# Patient Record
Sex: FEMALE | Race: WHITE | NOT HISPANIC OR LATINO | Employment: UNEMPLOYED | URBAN - METROPOLITAN AREA
[De-identification: names, ages, dates, MRNs, and addresses within clinical notes are randomized per-mention and may not be internally consistent; named-entity substitution may affect disease eponyms.]

---

## 2020-10-02 ENCOUNTER — HOSPITAL ENCOUNTER (OUTPATIENT)
Dept: URGENT CARE | Facility: CLINIC | Age: 25
Discharge: HOME | End: 2020-10-02
Attending: EMERGENCY MEDICINE

## 2020-11-03 ENCOUNTER — OFFICE VISIT (OUTPATIENT)
Dept: FAMILY MEDICINE CLINIC | Facility: CLINIC | Age: 25
End: 2020-11-03

## 2020-11-03 VITALS
HEART RATE: 84 BPM | DIASTOLIC BLOOD PRESSURE: 74 MMHG | BODY MASS INDEX: 37.89 KG/M2 | WEIGHT: 250 LBS | OXYGEN SATURATION: 99 % | HEIGHT: 68 IN | SYSTOLIC BLOOD PRESSURE: 98 MMHG | TEMPERATURE: 97.5 F

## 2020-11-03 DIAGNOSIS — Z3A.09 9 WEEKS GESTATION OF PREGNANCY: Primary | ICD-10-CM

## 2020-11-03 PROCEDURE — 99203 OFFICE O/P NEW LOW 30 MIN: CPT | Performed by: NURSE PRACTITIONER

## 2020-11-03 RX ORDER — PHENOL 1.4 %
600 AEROSOL, SPRAY (ML) MUCOUS MEMBRANE DAILY
COMMUNITY
End: 2021-04-28

## 2020-11-03 RX ORDER — FERROUS SULFATE 325(65) MG
325 TABLET ORAL
Status: ON HOLD | COMMUNITY
End: 2021-06-15 | Stop reason: SDUPTHER

## 2020-11-03 RX ORDER — PRENATAL VIT NO.126/IRON/FOLIC 28MG-0.8MG
1 TABLET ORAL DAILY
COMMUNITY
End: 2021-06-25

## 2020-11-03 NOTE — PROGRESS NOTES
Subjective   Randi Martell is a 25 y.o. female.     Chief Complaint   Patient presents with   • Possible Pregnancy     New Patient - referral to OBGYN     Ms. Martell presents today to establish care with a PCP. She reports she is 9.5 weeks pregnant and is needing a referral to OB/GYN. She report that other than morning sickness she is doing well and has no concerns.        I have reviewed the patient's medical history in detail and updated the computerized patient record.    The following portions of the patient's history were reviewed and updated as appropriate: allergies, current medications, past family history, past medical history, past social history, past surgical history and problem list.      Current Outpatient Medications:   •  calcium carbonate (OS-BLANQUITA) 600 MG tablet, Take 600 mg by mouth Daily., Disp: , Rfl:   •  ferrous sulfate 325 (65 FE) MG tablet, Take 325 mg by mouth Daily With Breakfast., Disp: , Rfl:   •  prenatal vitamin (prenatal, CLASSIC, vitamin) tablet, Take 1 tablet by mouth Daily., Disp: , Rfl:      Review of Systems   Constitutional: Negative.    Respiratory: Negative.    Cardiovascular: Negative.    Gastrointestinal: Positive for nausea and vomiting.   Genitourinary:        Pregnant about 9 weeks   Neurological: Negative.    Psychiatric/Behavioral: Negative.        Objective    Vitals:    11/03/20 1042   BP: 98/74   Pulse: 84   Temp: 97.5 °F (36.4 °C)   SpO2: 99%     Physical Exam  Constitutional:       Appearance: Normal appearance.   Neck:      Musculoskeletal: Normal range of motion and neck supple.   Cardiovascular:      Rate and Rhythm: Normal rate and regular rhythm.      Pulses: Normal pulses.      Heart sounds: Normal heart sounds.   Pulmonary:      Effort: Pulmonary effort is normal.      Breath sounds: Normal breath sounds.   Musculoskeletal: Normal range of motion.      Right lower leg: No edema.      Left lower leg: No edema.   Neurological:      Mental Status: She is alert  and oriented to person, place, and time.   Psychiatric:         Mood and Affect: Mood normal.         Behavior: Behavior normal.         Thought Content: Thought content normal.         Judgment: Judgment normal.           Assessment/Plan   Diagnoses and all orders for this visit:    1. 9 weeks gestation of pregnancy (Primary)  -     Ambulatory Referral to Obstetrics / Gynecology      Ms. Best's physical exam is unremarkable today.  I have reviewed her medical records that are available in the Epic EMR system today, which there are none.   I am referring her to OB/GYN for evaluation and management of her pregnancy.   I have discussed with her that she may see me as she needs me and next summer after she delivers for an annual physical exam.

## 2020-11-06 ENCOUNTER — TELEPHONE (OUTPATIENT)
Dept: FAMILY MEDICINE CLINIC | Facility: CLINIC | Age: 25
End: 2020-11-06

## 2020-11-06 NOTE — TELEPHONE ENCOUNTER
[facility]    Received Disability forms by fax and need to discuss with patient.    HUB - ok to transfer

## 2020-11-09 ENCOUNTER — INITIAL PRENATAL (OUTPATIENT)
Dept: OBSTETRICS AND GYNECOLOGY | Facility: CLINIC | Age: 25
End: 2020-11-09

## 2020-11-09 VITALS — WEIGHT: 249.6 LBS | BODY MASS INDEX: 37.95 KG/M2 | SYSTOLIC BLOOD PRESSURE: 116 MMHG | DIASTOLIC BLOOD PRESSURE: 83 MMHG

## 2020-11-09 DIAGNOSIS — Z34.90 EARLY STAGE OF PREGNANCY: Primary | ICD-10-CM

## 2020-11-09 LAB
GLUCOSE UR STRIP-MCNC: NEGATIVE MG/DL
PROT UR STRIP-MCNC: ABNORMAL MG/DL

## 2020-11-09 PROCEDURE — 99203 OFFICE O/P NEW LOW 30 MIN: CPT | Performed by: OBSTETRICS & GYNECOLOGY

## 2020-11-09 PROCEDURE — 81025 URINE PREGNANCY TEST: CPT | Performed by: OBSTETRICS & GYNECOLOGY

## 2020-11-09 RX ORDER — PROMETHAZINE HYDROCHLORIDE 25 MG/1
25 TABLET ORAL EVERY 6 HOURS PRN
Qty: 30 TABLET | Refills: 0 | Status: SHIPPED | OUTPATIENT
Start: 2020-11-09 | End: 2021-01-15

## 2020-11-09 NOTE — PROGRESS NOTES
Chief Complaint   Patient presents with   • Initial Prenatal Visit     HPI- Pt is 25 y.o.  at 10w2d here for prenatal visit.  Patient presents for initial OB visit.  This pregnancy was unplanned and patient states she is sure regarding her LMP.  Patient does report significant nausea and vomiting and is requesting medication.  She is taking prenatal vitamins and iron.  She has no major medical problems.    ROS-     - No vaginal bleeding    GI- No abdominal pain    /83   Wt 113 kg (249 lb 9.6 oz)   LMP 2020 (Exact Date)   BMI 37.95 kg/m²   Exam - See flow sheet    Fetal heart rate is normal    Assessment-  Diagnoses and all orders for this visit:    Early stage of pregnancy  -     OB Panel With HIV  -     Urine Culture - Urine, Urine, Clean Catch  -     IGP,CtNgTv,rfx Aptima HPV ASCU  -     US Ob Transvaginal; Future    Other orders  -     POC Urinalysis Dipstick  -     promethazine (PHENERGAN) 25 MG tablet; Take 1 tablet by mouth Every 6 (Six) Hours As Needed for Nausea or Vomiting.      Initial OB counseling was done.  Discussed delivering hospital and call system.  OB labs were ordered along with dating ultrasound.  Prescription was sent for Phenergan for her to use as needed for nausea.  She will follow-up in 4 weeks for next OB visit.    Counseling was given to patient for the following topics: risk factor reductions, patient and family education and impressions . Total time of the encounter was 30 minutes and 20 minutes was spend counseling.

## 2020-11-10 LAB
ABO GROUP BLD: ABNORMAL
BASOPHILS # BLD AUTO: 0 X10E3/UL (ref 0–0.2)
BASOPHILS NFR BLD AUTO: 0 %
BLD GP AB SCN SERPL QL: NEGATIVE
EOSINOPHIL # BLD AUTO: 0 X10E3/UL (ref 0–0.4)
EOSINOPHIL NFR BLD AUTO: 0 %
ERYTHROCYTE [DISTWIDTH] IN BLOOD BY AUTOMATED COUNT: 19.3 % (ref 11.7–15.4)
HBV SURFACE AG SERPL QL IA: NEGATIVE
HCT VFR BLD AUTO: 34.5 % (ref 34–46.6)
HCV AB S/CO SERPL IA: <0.1 S/CO RATIO (ref 0–0.9)
HGB BLD-MCNC: 11.3 G/DL (ref 11.1–15.9)
HIV 1+2 AB+HIV1 P24 AG SERPL QL IA: NON REACTIVE
IMM GRANULOCYTES # BLD AUTO: 0 X10E3/UL (ref 0–0.1)
IMM GRANULOCYTES NFR BLD AUTO: 0 %
LYMPHOCYTES # BLD AUTO: 1.8 X10E3/UL (ref 0.7–3.1)
LYMPHOCYTES NFR BLD AUTO: 24 %
MCH RBC QN AUTO: 25.2 PG (ref 26.6–33)
MCHC RBC AUTO-ENTMCNC: 32.8 G/DL (ref 31.5–35.7)
MCV RBC AUTO: 77 FL (ref 79–97)
MONOCYTES # BLD AUTO: 0.5 X10E3/UL (ref 0.1–0.9)
MONOCYTES NFR BLD AUTO: 7 %
NEUTROPHILS # BLD AUTO: 5.1 X10E3/UL (ref 1.4–7)
NEUTROPHILS NFR BLD AUTO: 69 %
PLATELET # BLD AUTO: 357 X10E3/UL (ref 150–450)
RBC # BLD AUTO: 4.48 X10E6/UL (ref 3.77–5.28)
RH BLD: POSITIVE
RPR SER QL: NON REACTIVE
RUBV IGG SERPL IA-ACNC: 3.77 INDEX
WBC # BLD AUTO: 7.4 X10E3/UL (ref 3.4–10.8)

## 2020-11-11 LAB
BACTERIA UR CULT: NO GROWTH
BACTERIA UR CULT: NORMAL
C TRACH RRNA CVX QL NAA+PROBE: NEGATIVE
CONV .: NORMAL
CYTOLOGIST CVX/VAG CYTO: NORMAL
CYTOLOGY CVX/VAG DOC CYTO: NORMAL
CYTOLOGY CVX/VAG DOC THIN PREP: NORMAL
DX ICD CODE: NORMAL
HIV 1 & 2 AB SER-IMP: NORMAL
N GONORRHOEA RRNA CVX QL NAA+PROBE: NEGATIVE
OTHER STN SPEC: NORMAL
STAT OF ADQ CVX/VAG CYTO-IMP: NORMAL
T VAGINALIS RRNA SPEC QL NAA+PROBE: NEGATIVE

## 2020-11-12 LAB
B-HCG UR QL: POSITIVE
INTERNAL NEGATIVE CONTROL: NEGATIVE
INTERNAL POSITIVE CONTROL: POSITIVE
Lab: ABNORMAL

## 2020-11-18 ENCOUNTER — TELEPHONE (OUTPATIENT)
Dept: FAMILY MEDICINE CLINIC | Facility: CLINIC | Age: 25
End: 2020-11-18

## 2020-11-18 NOTE — TELEPHONE ENCOUNTER
PATIENT'S MEDICAL LEAVE PROVIDER IS CALLING TO GET HELP WITH PAPERWORK. THEY NEED TO GET A REASON FOR THIS PATIENT BEING OFF WORK.   SHE WOULD LIKE TO GET INFO FOR DATE OF: 10/7/2020    FAXED INFO TO OFFICE ON 11/9/2020    THERE INFO IS ON THE FORMS   FX#: [phone]

## 2020-12-09 ENCOUNTER — ROUTINE PRENATAL (OUTPATIENT)
Dept: OBSTETRICS AND GYNECOLOGY | Facility: CLINIC | Age: 25
End: 2020-12-09

## 2020-12-09 VITALS — DIASTOLIC BLOOD PRESSURE: 81 MMHG | SYSTOLIC BLOOD PRESSURE: 114 MMHG | BODY MASS INDEX: 38.5 KG/M2 | WEIGHT: 253.2 LBS

## 2020-12-09 DIAGNOSIS — O21.9 NAUSEA AND VOMITING DURING PREGNANCY PRIOR TO 22 WEEKS GESTATION: ICD-10-CM

## 2020-12-09 DIAGNOSIS — Z34.02 ENCOUNTER FOR SUPERVISION OF NORMAL FIRST PREGNANCY IN SECOND TRIMESTER: Primary | ICD-10-CM

## 2020-12-09 LAB
GLUCOSE UR STRIP-MCNC: NEGATIVE MG/DL
PROT UR STRIP-MCNC: ABNORMAL MG/DL

## 2020-12-09 PROCEDURE — 99213 OFFICE O/P EST LOW 20 MIN: CPT | Performed by: OBSTETRICS & GYNECOLOGY

## 2020-12-09 RX ORDER — ONDANSETRON 4 MG/1
4 TABLET, FILM COATED ORAL EVERY 6 HOURS PRN
Qty: 30 TABLET | Refills: 1 | Status: SHIPPED | OUTPATIENT
Start: 2020-12-09 | End: 2021-06-15 | Stop reason: HOSPADM

## 2020-12-09 NOTE — PROGRESS NOTES
Chief Complaint   Patient presents with   • Routine Prenatal Visit     HPI- Pt is 25 y.o.  at 14w4d here for prenatal visit.  Patient states that she is continuing to have nausea and vomiting.  She does report that she has some good days.  She has been using Phenergan as needed.    ROS-     - No vaginal bleeding    GI- No abdominal pain    /81   Wt 115 kg (253 lb 3.2 oz)   LMP 2020 (Exact Date)   BMI 38.50 kg/m²   Exam - See flow sheet    Fetal heart rate is normal    Assessment-  Diagnoses and all orders for this visit:    Encounter for supervision of normal first pregnancy in second trimester  -     [id number] PLUS Core - Blood,    Nausea and vomiting during pregnancy prior to 22 weeks gestation  -     ondansetron (Zofran) 4 MG tablet; Take 1 tablet by mouth Every 6 (Six) Hours As Needed for Nausea or Vomiting.    Other orders  -     POC Urinalysis Dipstick    Initial OB labs and dating ultrasound were reviewed with the patient.  She has had weight gain since her last visit.  We will try Zofran as needed for nausea.  Discussed with patient eating small, frequent meals throughout the day to help with her symptoms.  Discussed genetic screening and she elects for cell free DNA testing.  She will follow up in 4 weeks.

## 2020-12-14 ENCOUNTER — TELEPHONE (OUTPATIENT)
Dept: OBSTETRICS AND GYNECOLOGY | Facility: CLINIC | Age: 25
End: 2020-12-14

## 2020-12-17 LAB
CFDNA.FET/CFDNA.TOTAL SFR FETUS: NORMAL %
CITATION REF LAB TEST: NORMAL
FET 13+18+21+X+Y ANEUP PLAS.CFDNA: NEGATIVE
FET CHR 21 TS PLAS.CFDNA QL: NEGATIVE
FET SEX PLAS.CFDNA DOSAGE CFDNA: NORMAL
FET TS 13 RISK PLAS.CFDNA QL: NEGATIVE
FET TS 18 RISK WBC.DNA+CFDNA QL: NEGATIVE
GA EST FROM CONCEPTION DATE: NORMAL D
GESTATIONAL AGE > 9:: YES
LAB DIRECTOR NAME PROVIDER: NORMAL
LAB DIRECTOR NAME PROVIDER: NORMAL
LABORATORY COMMENT REPORT: NORMAL
LIMITATIONS OF THE TEST: NORMAL
NEGATIVE PREDICTIVE VALUE: NORMAL
NOTE: NORMAL
PERFORMANCE CHARACTERISTICS: NORMAL
POSITIVE PREDICTIVE VALUE: NORMAL
REF LAB TEST METHOD: NORMAL
TEST PERFORMANCE INFO SPEC: NORMAL

## 2020-12-18 ENCOUNTER — TELEPHONE (OUTPATIENT)
Dept: OBSTETRICS AND GYNECOLOGY | Facility: CLINIC | Age: 25
End: 2020-12-18

## 2020-12-18 NOTE — TELEPHONE ENCOUNTER
----- Message from Clarita Hermosillo MD sent at 12/17/2020  4:20 PM EST -----  Please let patient know that her genetic screen is negative and if she wants to know gender, it shows a male fetus

## 2021-01-15 ENCOUNTER — ROUTINE PRENATAL (OUTPATIENT)
Dept: OBSTETRICS AND GYNECOLOGY | Facility: CLINIC | Age: 26
End: 2021-01-15

## 2021-01-15 VITALS — SYSTOLIC BLOOD PRESSURE: 129 MMHG | BODY MASS INDEX: 39.68 KG/M2 | WEIGHT: 261 LBS | DIASTOLIC BLOOD PRESSURE: 83 MMHG

## 2021-01-15 DIAGNOSIS — Z36.89 ENCOUNTER FOR FETAL ANATOMIC SURVEY: ICD-10-CM

## 2021-01-15 DIAGNOSIS — Z34.02 ENCOUNTER FOR SUPERVISION OF NORMAL FIRST PREGNANCY IN SECOND TRIMESTER: Primary | ICD-10-CM

## 2021-01-15 LAB
GLUCOSE UR STRIP-MCNC: NEGATIVE MG/DL
PROT UR STRIP-MCNC: NEGATIVE MG/DL

## 2021-01-15 PROCEDURE — 99213 OFFICE O/P EST LOW 20 MIN: CPT | Performed by: OBSTETRICS & GYNECOLOGY

## 2021-01-15 NOTE — PROGRESS NOTES
Chief Complaint   Patient presents with   • Routine Prenatal Visit     HPI- Pt is 25 y.o.  at 19w6d here for prenatal visit.  Patient has no complaints.  She reports her nausea has improved.  She is starting to feel some fetal movement.    ROS-     - No vaginal bleeding    GI- No abdominal pain    /83   Wt 118 kg (261 lb)   LMP 2020 (Exact Date)   BMI 39.68 kg/m²   Exam - See flow sheet    Fetal heart rate is normal    Assessment-  Diagnoses and all orders for this visit:    Encounter for supervision of normal first pregnancy in second trimester  -     Alpha Fetoprotein, Maternal    Encounter for fetal anatomic survey  -     US Ob 14 + Weeks Single or First Gestation; Future    Other orders  -     POC Urinalysis Dipstick      Patient is doing well.  Patient had normal cell free DNA testing.  She was counseled on screening for spina bifida and she desires.  She will follow-up next week for anatomy ultrasound and will see me back in 4 weeks.    I spent 20 minutes caring for Randi on this date of service. This time includes time spent by me in the following activities:reviewing tests, performing a medically appropriate examination and/or evaluation , counseling and educating the patient/family/caregiver, ordering medications, tests, or procedures and documenting information in the medical record

## 2021-01-17 LAB
AFP ADJ MOM SERPL: 0.87
AFP INTERP SERPL-IMP: NORMAL
AFP INTERP SERPL-IMP: NORMAL
AFP SERPL-MCNC: 34 NG/ML
AGE AT DELIVERY: 27 YR
GA METHOD: NORMAL
GA: 19.6 WEEKS
IDDM PATIENT QL: NO
LABORATORY COMMENT REPORT: NORMAL
MULTIPLE PREGNANCY: NO
NEURAL TUBE DEFECT RISK FETUS: NORMAL %
RESULT: NORMAL

## 2021-01-19 ENCOUNTER — TELEPHONE (OUTPATIENT)
Dept: OBSTETRICS AND GYNECOLOGY | Facility: CLINIC | Age: 26
End: 2021-01-19

## 2021-01-19 NOTE — TELEPHONE ENCOUNTER
----- Message from Clarita Hermosillo MD sent at 1/18/2021 12:47 PM EST -----  Please let patient know her screening for neural tube defects was negative

## 2021-02-19 ENCOUNTER — ROUTINE PRENATAL (OUTPATIENT)
Dept: OBSTETRICS AND GYNECOLOGY | Facility: CLINIC | Age: 26
End: 2021-02-19

## 2021-02-19 VITALS — DIASTOLIC BLOOD PRESSURE: 84 MMHG | BODY MASS INDEX: 42.57 KG/M2 | SYSTOLIC BLOOD PRESSURE: 127 MMHG | WEIGHT: 280 LBS

## 2021-02-19 DIAGNOSIS — Z34.02 ENCOUNTER FOR SUPERVISION OF NORMAL FIRST PREGNANCY IN SECOND TRIMESTER: Primary | ICD-10-CM

## 2021-02-19 LAB
GLUCOSE UR STRIP-MCNC: NEGATIVE MG/DL
PROT UR STRIP-MCNC: NEGATIVE MG/DL

## 2021-02-19 PROCEDURE — 99213 OFFICE O/P EST LOW 20 MIN: CPT | Performed by: OBSTETRICS & GYNECOLOGY

## 2021-02-19 NOTE — PROGRESS NOTES
Chief Complaint   Patient presents with   • Routine Prenatal Visit     HPI- Pt is 25 y.o.  at 24w6d here for prenatal visit.  Patient is doing well with no complaints.  She is feeling lots of fetal movement.    ROS-     - No vaginal bleeding    GI- No abdominal pain    /84   Wt 127 kg (280 lb)   LMP 2020 (Exact Date)   BMI 42.57 kg/m²   Exam - See flow sheet    Fetal heart rate is normal    Assessment-  Diagnoses and all orders for this visit:    Encounter for supervision of normal first pregnancy in second trimester  -     Gestational Screen 1 Hr ([facility])  -     CBC (No Diff)    Other orders  -     POC Urinalysis Dipstick    Reviewed anatomy ultrasound with patient and explained that it is normal.  Discussed screening for diabetes at her next visit and instructions discussed.  Discussed prenatal classes and hospital tours.  She will follow-up in 4 weeks.    I spent 20 minutes caring for Randi on this date of service. This time includes time spent by me in the following activities:reviewing tests, obtaining and/or reviewing a separately obtained history, performing a medically appropriate examination and/or evaluation , counseling and educating the patient/family/caregiver, ordering medications, tests, or procedures and documenting information in the medical record

## 2021-03-19 ENCOUNTER — ROUTINE PRENATAL (OUTPATIENT)
Dept: OBSTETRICS AND GYNECOLOGY | Facility: CLINIC | Age: 26
End: 2021-03-19

## 2021-03-19 VITALS — DIASTOLIC BLOOD PRESSURE: 79 MMHG | SYSTOLIC BLOOD PRESSURE: 115 MMHG | WEIGHT: 292 LBS | BODY MASS INDEX: 44.4 KG/M2

## 2021-03-19 DIAGNOSIS — Z34.03 ENCOUNTER FOR SUPERVISION OF NORMAL FIRST PREGNANCY IN THIRD TRIMESTER: Primary | ICD-10-CM

## 2021-03-19 LAB
GLUCOSE UR STRIP-MCNC: NEGATIVE MG/DL
PROT UR STRIP-MCNC: NEGATIVE MG/DL

## 2021-03-19 PROCEDURE — 99213 OFFICE O/P EST LOW 20 MIN: CPT | Performed by: OBSTETRICS & GYNECOLOGY

## 2021-03-19 NOTE — PROGRESS NOTES
Chief Complaint   Patient presents with   • Routine Prenatal Visit     HPI- Pt is 25 y.o.  at 28w6d here for prenatal visit.  Patient has no complaints.  She reports good fetal movement.    ROS-     - No vaginal bleeding    GI- No abdominal pain    /79   Wt 132 kg (292 lb)   LMP 2020 (Exact Date)   BMI 44.40 kg/m²   Exam - See flow sheet    Fetal heart rate is normal    Assessment-  Diagnoses and all orders for this visit:    Encounter for supervision of normal first pregnancy in third trimester    Other orders  -     Discontinue: Calcium 280 MG tablet; Take  by mouth.  -     POC Urinalysis Dipstick    Patient continues to do well.  She is doing her 1 hour glucose test today.  Discussed with patient finding a pediatrician before delivery.  She will follow-up with me in 2 weeks.    I spent 20 minutes caring for Randi on this date of service. This time includes time spent by me in the following activities:performing a medically appropriate examination and/or evaluation , counseling and educating the patient/family/caregiver and documenting information in the medical record

## 2021-03-20 LAB
ERYTHROCYTE [DISTWIDTH] IN BLOOD BY AUTOMATED COUNT: 13.8 % (ref 12.3–15.4)
GLUCOSE 1H P 50 G GLC PO SERPL-MCNC: 103 MG/DL (ref 65–139)
HCT VFR BLD AUTO: 32.7 % (ref 34–46.6)
HGB BLD-MCNC: 10.7 G/DL (ref 12–15.9)
MCH RBC QN AUTO: 27.6 PG (ref 26.6–33)
MCHC RBC AUTO-ENTMCNC: 32.7 G/DL (ref 31.5–35.7)
MCV RBC AUTO: 84.5 FL (ref 79–97)
PLATELET # BLD AUTO: 290 10*3/MM3 (ref 140–450)
RBC # BLD AUTO: 3.87 10*6/MM3 (ref 3.77–5.28)
WBC # BLD AUTO: 9.8 10*3/MM3 (ref 3.4–10.8)

## 2021-03-22 ENCOUNTER — TELEPHONE (OUTPATIENT)
Dept: OBSTETRICS AND GYNECOLOGY | Facility: CLINIC | Age: 26
End: 2021-03-22

## 2021-03-22 NOTE — TELEPHONE ENCOUNTER
----- Message from Clarita Hermosillo MD sent at 3/22/2021  8:59 AM EDT -----  Please let patient know that she passed her glucose test.  Her blood work does show that she is slightly anemic and she should make sure that she is taking ferrous sulfate once daily.  If she needs me to send a prescription, please let me know.

## 2021-04-02 ENCOUNTER — ROUTINE PRENATAL (OUTPATIENT)
Dept: OBSTETRICS AND GYNECOLOGY | Facility: CLINIC | Age: 26
End: 2021-04-02

## 2021-04-02 VITALS — SYSTOLIC BLOOD PRESSURE: 118 MMHG | WEIGHT: 293 LBS | DIASTOLIC BLOOD PRESSURE: 70 MMHG | BODY MASS INDEX: 45.61 KG/M2

## 2021-04-02 DIAGNOSIS — Z34.83 PRENATAL CARE, SUBSEQUENT PREGNANCY IN THIRD TRIMESTER: Primary | ICD-10-CM

## 2021-04-02 DIAGNOSIS — O99.213 OBESITY AFFECTING PREGNANCY IN THIRD TRIMESTER: ICD-10-CM

## 2021-04-02 DIAGNOSIS — Z3A.30 30 WEEKS GESTATION OF PREGNANCY: ICD-10-CM

## 2021-04-02 LAB
GLUCOSE UR STRIP-MCNC: NEGATIVE MG/DL
PROT UR STRIP-MCNC: NEGATIVE MG/DL

## 2021-04-02 PROCEDURE — 99213 OFFICE O/P EST LOW 20 MIN: CPT | Performed by: OBSTETRICS & GYNECOLOGY

## 2021-04-02 NOTE — PROGRESS NOTES
OB follow up     Randi Martell is a 25 y.o.  30w6d being seen today for her obstetrical visit.  Patient reports no complaints. Fetal movement: normal.    Her prenatal care is complicated by (and status): Maternal obesity affecting pregnancy    Review of Systems  Cramping/contractions : Occasional Louann Martinez with activity  Vaginal bleeding: None  Fetal movement normal and active    /70   Wt 136 kg (300 lb)   LMP 2020 (Exact Date)   BMI 45.61 kg/m²     FHT:  142 BPM   Uterine Size: size greater than dates       Assessment    Diagnoses and all orders for this visit:    1. Prenatal care, subsequent pregnancy in third trimester (Primary)    2. 30 weeks gestation of pregnancy    3. Obesity affecting pregnancy in third trimester  -     US Ob Follow Up Transabdominal Approach; Future        1) pregnancy at 30w6d  2) maternal obesity affecting pregnancy.  Will get growth ultrasound for her next visit with Dr. Salazar.        Plan    Reviewed this stage of pregnancy  Problem list updated   Follow up in 2 weeks with growth ultrasound and appointment with Dr. Hermosillo.  Tdap discussed with the patient and she will decide between now and her next visit.    Peter Cesar MD   2021  11:25 EDT

## 2021-04-16 ENCOUNTER — ROUTINE PRENATAL (OUTPATIENT)
Dept: OBSTETRICS AND GYNECOLOGY | Facility: CLINIC | Age: 26
End: 2021-04-16

## 2021-04-16 VITALS — BODY MASS INDEX: 45.22 KG/M2 | SYSTOLIC BLOOD PRESSURE: 117 MMHG | DIASTOLIC BLOOD PRESSURE: 84 MMHG | WEIGHT: 293 LBS

## 2021-04-16 DIAGNOSIS — O99.213 OBESITY AFFECTING PREGNANCY IN THIRD TRIMESTER: Primary | ICD-10-CM

## 2021-04-16 DIAGNOSIS — Z3A.32 32 WEEKS GESTATION OF PREGNANCY: ICD-10-CM

## 2021-04-16 LAB
GLUCOSE UR STRIP-MCNC: NEGATIVE MG/DL
PROT UR STRIP-MCNC: NEGATIVE MG/DL

## 2021-04-16 PROCEDURE — 90715 TDAP VACCINE 7 YRS/> IM: CPT | Performed by: OBSTETRICS & GYNECOLOGY

## 2021-04-16 PROCEDURE — 90471 IMMUNIZATION ADMIN: CPT | Performed by: OBSTETRICS & GYNECOLOGY

## 2021-04-16 PROCEDURE — 99213 OFFICE O/P EST LOW 20 MIN: CPT | Performed by: OBSTETRICS & GYNECOLOGY

## 2021-04-16 NOTE — PROGRESS NOTES
Chief Complaint   Patient presents with   • Routine Prenatal Visit     HPI- Pt is 25 y.o.  at 32w6d here for prenatal visit.  Patient is doing well with no major complaints.  She reports adequate fetal movement.    ROS-     - No vaginal bleeding    GI- No abdominal pain    /84   Wt 135 kg (297 lb 6.4 oz)   LMP 2020 (Exact Date)   BMI 45.22 kg/m²   Exam - See flow sheet    Fetal heart rate is normal    Assessment-  Diagnoses and all orders for this visit:    Obesity affecting pregnancy in third trimester    32 weeks gestation of pregnancy    Other orders  -     POC Urinalysis Dipstick      Growth ultrasound was performed today and shows appropriate fetal growth.  Ultrasound was reviewed with her.  We will repeat ultrasound in 4 weeks.  Discussed recommendations for Tdap and she desires.  She will follow-up in 2 weeks.    I spent 20 minutes caring for Randi on this date of service. This time includes time spent by me in the following activities:obtaining and/or reviewing a separately obtained history, performing a medically appropriate examination and/or evaluation , counseling and educating the patient/family/caregiver, ordering medications, tests, or procedures, documenting information in the medical record and independently interpreting results and communicating that information with the patient/family/caregiver

## 2021-04-28 ENCOUNTER — HOSPITAL ENCOUNTER (OUTPATIENT)
Dept: LABOR AND DELIVERY | Facility: HOSPITAL | Age: 26
Discharge: HOME | End: 2021-04-28
Attending: OBSTETRICS & GYNECOLOGY

## 2021-04-28 ENCOUNTER — ROUTINE PRENATAL (OUTPATIENT)
Dept: OBSTETRICS AND GYNECOLOGY | Facility: CLINIC | Age: 26
End: 2021-04-28

## 2021-04-28 VITALS — SYSTOLIC BLOOD PRESSURE: 110 MMHG | WEIGHT: 293 LBS | BODY MASS INDEX: 46.28 KG/M2 | DIASTOLIC BLOOD PRESSURE: 79 MMHG

## 2021-04-28 DIAGNOSIS — Z3A.34 34 WEEKS GESTATION OF PREGNANCY: ICD-10-CM

## 2021-04-28 DIAGNOSIS — O99.213 OBESITY AFFECTING PREGNANCY IN THIRD TRIMESTER: Primary | ICD-10-CM

## 2021-04-28 LAB
GLUCOSE UR STRIP-MCNC: NEGATIVE MG/DL
PROT UR STRIP-MCNC: NEGATIVE MG/DL

## 2021-04-28 PROCEDURE — 99213 OFFICE O/P EST LOW 20 MIN: CPT | Performed by: OBSTETRICS & GYNECOLOGY

## 2021-04-28 NOTE — PROGRESS NOTES
Chief Complaint   Patient presents with   • Routine Prenatal Visit     HPI- Pt is 25 y.o.  at 34w4d here for prenatal visit.  Patient is doing well.  She does report some back pain.  She reports adequate fetal movement.         ROS-     - No vaginal bleeding    GI- No abdominal pain    /79   Wt (!) 138 kg (304 lb 6.4 oz)   LMP 2020 (Exact Date)   BMI 46.28 kg/m²   Exam - See flow sheet    Fetal heart rate is normal    Assessment-  Diagnoses and all orders for this visit:    Obesity affecting pregnancy in third trimester    34 weeks gestation of pregnancy    Other orders  -     POC Urinalysis Dipstick    She continues to do well.  Discussed GBS culture and cervical exam at her next visit.    I spent 20 minutes caring for Randi on this date of service. This time includes time spent by me in the following activities:obtaining and/or reviewing a separately obtained history, performing a medically appropriate examination and/or evaluation , counseling and educating the patient/family/caregiver and documenting information in the medical record

## 2021-04-28 NOTE — PATIENT INSTRUCTIONS
Group B Streptococcus Test During Pregnancy  Why am I having this test?  Routine testing, also called screening, for group B streptococcus (GBS) is recommended for all pregnant women between the 36th and 37th week of pregnancy. GBS is a type of bacteria that can be passed from mother to baby during childbirth. Screening will help guide whether or not you will need treatment during labor and delivery to prevent complications such as:  · An infection in your uterus during labor.  · An infection in your uterus after delivery.  · A serious infection in your baby after delivery, such as pneumonia, meningitis, or sepsis.  GBS screening is not often done before 36 weeks of pregnancy unless you go into labor prematurely.  What happens if I have group B streptococcus?  If testing shows that you have GBS, your health care provider will recommend treatment with IV antibiotics during labor and delivery. This treatment significantly decreases the risk of complications for you and your baby.  If you have a planned  and you have GBS, you may not need to be treated with antibiotics because GBS is usually passed to babies after labor starts and your water breaks. If you are in labor or your water breaks before your , it is possible for GBS to get into your uterus and be passed to your baby, so you might need treatment.  Is there a chance I may not need to be tested?  You may not need to be tested for GBS if:  · You have a urine test that shows GBS before 36 to 37 weeks.  · You had a baby with GBS infection after a previous delivery.  In these cases, you will automatically be treated for GBS during labor and delivery.  What is being tested?  This test is done to check if you have group B streptococcus in your vagina or rectum.  What kind of sample is taken?  To collect samples for this test, your health care provider will swab your vagina and rectum with a cotton swab. The sample is then sent to the lab to see if  GBS is present.  What happens during the test?    · You will remove your clothing from the waist down.  · You will lie down on an exam table in the same position as you would for a pelvic exam.  · Your health care provider will swab your vagina and rectum to collect samples for a culture test.  · You will be able to go home after the test and do all your usual activities.  How are the results reported?  The test results are reported as positive or negative.  What do the results mean?  · A positive test means you are at risk for passing GBS to your baby during labor and delivery. Your health care provider will recommend that you are treated with an IV antibiotic during labor and delivery.  · A negative test means you are at very low risk of passing GBS to your baby. There is still a low risk of passing GBS to your baby because sometimes test results may report that you do not have a condition when you do (false-negative result) or there is a chance that you may become infected with GBS after the test is done. You most likely will not need to be treated with an antibiotic during labor and delivery.  Talk with your health care provider about what your results mean.  Questions to ask your health care provider  Ask your health care provider, or the department that is doing the test:  · When will my results be ready?  · How will I get my results?  · What are my treatment options?  Summary  · Routine testing (screening) for group B streptococcus (GBS) is recommended for all pregnant women between the 36th and 37th week of pregnancy.  · GBS is a type of bacteria that can be passed from mother to baby during childbirth.  · If testing shows that you have GBS, your health care provider will recommend that you are treated with IV antibiotics during labor and delivery. This treatment almost always prevents infection in newborns.  This information is not intended to replace advice given to you by your health care provider. Make  sure you discuss any questions you have with your health care provider.  Document Revised: 04/09/2020 Document Reviewed: 01/15/2020  Elsevier Patient Education © 2021 [employer]

## 2021-05-14 ENCOUNTER — ROUTINE PRENATAL (OUTPATIENT)
Dept: OBSTETRICS AND GYNECOLOGY | Facility: CLINIC | Age: 26
End: 2021-05-14

## 2021-05-14 VITALS — BODY MASS INDEX: 46.83 KG/M2 | WEIGHT: 293 LBS | SYSTOLIC BLOOD PRESSURE: 117 MMHG | DIASTOLIC BLOOD PRESSURE: 82 MMHG

## 2021-05-14 DIAGNOSIS — Z3A.36 36 WEEKS GESTATION OF PREGNANCY: ICD-10-CM

## 2021-05-14 DIAGNOSIS — O99.213 OBESITY AFFECTING PREGNANCY IN THIRD TRIMESTER: Primary | ICD-10-CM

## 2021-05-14 LAB
GLUCOSE UR STRIP-MCNC: NEGATIVE MG/DL
PROT UR STRIP-MCNC: NEGATIVE MG/DL

## 2021-05-14 PROCEDURE — 99213 OFFICE O/P EST LOW 20 MIN: CPT | Performed by: OBSTETRICS & GYNECOLOGY

## 2021-05-14 NOTE — PROGRESS NOTES
Chief Complaint   Patient presents with   • Routine Prenatal Visit     HPI- Pt is 26 y.o.  at 36w6d here for prenatal visit.  She has no major complaints today.  She is feeling more discomfort and some Pangburn Martinez contractions.  She reports adequate fetal movement.    ROS-     - No vaginal bleeding    GI- No abdominal pain    /82   Wt (!) 140 kg (308 lb)   LMP 2020 (Exact Date)   BMI 46.83 kg/m²   Exam - See flow sheet    Fetal heart rate is normal    Assessment-  Diagnoses and all orders for this visit:    Obesity affecting pregnancy in third trimester  -     Group B Streptococcus Culture - Swab, Vaginal/Rectum    36 weeks gestation of pregnancy    Other orders  -     POC Urinalysis Dipstick    Labor precautions were discussed and when to go to the hospital.  GBS culture was obtained.  She will follow-up in 1 week.

## 2021-05-18 ENCOUNTER — ROUTINE PRENATAL (OUTPATIENT)
Dept: OBSTETRICS AND GYNECOLOGY | Facility: CLINIC | Age: 26
End: 2021-05-18

## 2021-05-18 VITALS — SYSTOLIC BLOOD PRESSURE: 121 MMHG | DIASTOLIC BLOOD PRESSURE: 88 MMHG | BODY MASS INDEX: 47.23 KG/M2 | WEIGHT: 293 LBS

## 2021-05-18 DIAGNOSIS — Z3A.37 37 WEEKS GESTATION OF PREGNANCY: ICD-10-CM

## 2021-05-18 DIAGNOSIS — O99.213 OBESITY AFFECTING PREGNANCY IN THIRD TRIMESTER: Primary | ICD-10-CM

## 2021-05-18 LAB
B-HEM STREP SPEC QL CULT: NEGATIVE
GLUCOSE UR STRIP-MCNC: NEGATIVE MG/DL
PROT UR STRIP-MCNC: NEGATIVE MG/DL

## 2021-05-18 PROCEDURE — 99213 OFFICE O/P EST LOW 20 MIN: CPT | Performed by: OBSTETRICS & GYNECOLOGY

## 2021-05-18 NOTE — PROGRESS NOTES
Chief Complaint   Patient presents with   • Routine Prenatal Visit     HPI- Pt is 26 y.o.  at 37w3d here for prenatal visit.  Patient has no major complaints.  She has felt some Brenton Martinez contractions.  She reports adequate fetal movement.       ROS-     - No vaginal bleeding    GI- No abdominal pain    /88   Wt (!) 141 kg (310 lb 9.6 oz)   LMP 2020 (Exact Date)   BMI 47.23 kg/m²   Exam - See flow sheet    Fetal heart rate is normal    Assessment-  Diagnoses and all orders for this visit:    Obesity affecting pregnancy in third trimester    37 weeks gestation of pregnancy    Other orders  -     POC Urinalysis Dipstick    Reviewed negative GBS culture.  Discussed when to go to labor and delivery and answered questions regarding epidural.  She continues to do well we will continue with expectant management.  She will follow-up with me in 1 week.    I spent 20 minutes caring for Randi on this date of service. This time includes time spent by me in the following activities:reviewing tests, obtaining and/or reviewing a separately obtained history, performing a medically appropriate examination and/or evaluation , counseling and educating the patient/family/caregiver and documenting information in the medical record

## 2021-05-28 ENCOUNTER — ROUTINE PRENATAL (OUTPATIENT)
Dept: OBSTETRICS AND GYNECOLOGY | Facility: CLINIC | Age: 26
End: 2021-05-28

## 2021-05-28 VITALS — WEIGHT: 293 LBS | BODY MASS INDEX: 47.01 KG/M2 | DIASTOLIC BLOOD PRESSURE: 88 MMHG | SYSTOLIC BLOOD PRESSURE: 120 MMHG

## 2021-05-28 DIAGNOSIS — Z3A.38 38 WEEKS GESTATION OF PREGNANCY: ICD-10-CM

## 2021-05-28 DIAGNOSIS — O99.213 OBESITY AFFECTING PREGNANCY IN THIRD TRIMESTER: Primary | ICD-10-CM

## 2021-05-28 LAB
GLUCOSE UR STRIP-MCNC: NEGATIVE MG/DL
PROT UR STRIP-MCNC: NEGATIVE MG/DL

## 2021-05-28 PROCEDURE — 99214 OFFICE O/P EST MOD 30 MIN: CPT | Performed by: OBSTETRICS & GYNECOLOGY

## 2021-05-28 NOTE — PROGRESS NOTES
Chief Complaint   Patient presents with   • Routine Prenatal Visit     HPI- Pt is 26 y.o.  at 38w6d here for prenatal visit. Patient continues to do well with no complaints. She reports adequate fetal movement. She denies leaking or bleeding.    ROS-     - No vaginal bleeding    GI- No abdominal pain    /88   Wt (!) 140 kg (309 lb 3.2 oz)   LMP 2020 (Exact Date)   BMI 47.01 kg/m²   Exam - See flow sheet    Fetal heart rate is normal    Assessment-  Diagnoses and all orders for this visit:    Obesity affecting pregnancy in third trimester  -     US Fetal Biophysical Profile;Without Non-Stress Testing; Future    38 weeks gestation of pregnancy    Other orders  -     POC Urinalysis Dipstick    Patient continues to do well. She will see one of my partners next week for OB follow-up and then will see me on  with BPP due to postdates and we will plan induction on  if she is still undelivered.

## 2021-06-08 ENCOUNTER — ROUTINE PRENATAL (OUTPATIENT)
Dept: OBSTETRICS AND GYNECOLOGY | Facility: CLINIC | Age: 26
End: 2021-06-08

## 2021-06-08 VITALS — SYSTOLIC BLOOD PRESSURE: 122 MMHG | WEIGHT: 293 LBS | BODY MASS INDEX: 47.83 KG/M2 | DIASTOLIC BLOOD PRESSURE: 89 MMHG

## 2021-06-08 DIAGNOSIS — O48.0 POST-TERM PREGNANCY, 40-42 WEEKS OF GESTATION: ICD-10-CM

## 2021-06-08 DIAGNOSIS — O36.63X0 EXCESSIVE FETAL GROWTH AFFECTING MANAGEMENT OF PREGNANCY IN THIRD TRIMESTER, SINGLE OR UNSPECIFIED FETUS: ICD-10-CM

## 2021-06-08 DIAGNOSIS — O99.213 OBESITY AFFECTING PREGNANCY IN THIRD TRIMESTER: Primary | ICD-10-CM

## 2021-06-08 DIAGNOSIS — Z3A.40 40 WEEKS GESTATION OF PREGNANCY: ICD-10-CM

## 2021-06-08 LAB
GLUCOSE UR STRIP-MCNC: NEGATIVE MG/DL
PROT UR STRIP-MCNC: NEGATIVE MG/DL

## 2021-06-08 PROCEDURE — 99214 OFFICE O/P EST MOD 30 MIN: CPT | Performed by: OBSTETRICS & GYNECOLOGY

## 2021-06-08 NOTE — PROGRESS NOTES
Chief Complaint   Patient presents with   • Routine Prenatal Visit     HPI- Pt is 26 y.o.  at 40w3d here for prenatal visit.  She reports some contractions, but denies any leaking or bleeding.  She reports adequate fetal movement.  She denies any headaches, vision changes, or right upper quadrant pain.    ROS-     - No vaginal bleeding    GI- No abdominal pain    /89   Wt (!) 143 kg (314 lb 9.6 oz)   LMP 2020 (Exact Date)   BMI 47.83 kg/m²   Exam - See flow sheet    Fetal heart rate is normal    Assessment-  Diagnoses and all orders for this visit:    Obesity affecting pregnancy in third trimester    Post-term pregnancy, 40-42 weeks of gestation  -     Labor Induction; Future    40 weeks gestation of pregnancy    Excessive fetal growth affecting management of pregnancy in third trimester, single or unspecified fetus    Other orders  -     POC Urinalysis Dipstick    Patient's ultrasound today shows LGA infant with estimated fetal weight of 9 pounds 6 ounces.  Her ultrasound at 32 weeks showed estimated fetal weight of less than the 50th percentile.  Explained to patient that an ultrasound at this gestational age can be off by 2 pounds.  She would still like to try for a vaginal delivery and I did explain that if the baby is truly 9 pounds 6 ounces, it does increase her risk for .  Her cervix is 1 to 2 cm and we will plan induction on Friday for postdates pregnancy.  Labor precautions were reviewed.

## 2021-06-11 ENCOUNTER — HOSPITAL ENCOUNTER (INPATIENT)
Facility: HOSPITAL | Age: 26
LOS: 4 days | Discharge: HOME | End: 2021-06-15
Attending: OBSTETRICS & GYNECOLOGY | Admitting: OBSTETRICS & GYNECOLOGY

## 2021-06-11 ENCOUNTER — HOSPITAL ENCOUNTER (OUTPATIENT)
Dept: LABOR AND DELIVERY | Facility: HOSPITAL | Age: 26
Discharge: HOME | End: 2021-06-11

## 2021-06-11 ENCOUNTER — ANESTHESIA EVENT (OUTPATIENT)
Dept: LABOR AND DELIVERY | Facility: HOSPITAL | Age: 26
End: 2021-06-11

## 2021-06-11 ENCOUNTER — ANESTHESIA (OUTPATIENT)
Dept: LABOR AND DELIVERY | Facility: HOSPITAL | Age: 26
End: 2021-06-11

## 2021-06-11 DIAGNOSIS — Z98.891 S/P CESAREAN SECTION: Primary | ICD-10-CM

## 2021-06-11 PROBLEM — O99.210 MATERNAL OBESITY AFFECTING PREGNANCY, ANTEPARTUM: Status: ACTIVE | Noted: 2021-06-11

## 2021-06-11 PROBLEM — Z34.90 PREGNANCY: Status: ACTIVE | Noted: 2021-06-11

## 2021-06-11 LAB
ABO GROUP BLD: NORMAL
BASOPHILS # BLD AUTO: 0.05 10*3/MM3 (ref 0–0.2)
BASOPHILS NFR BLD AUTO: 0.3 % (ref 0–1.5)
BLD GP AB SCN SERPL QL: NEGATIVE
DEPRECATED RDW RBC AUTO: 48.6 FL (ref 37–54)
EOSINOPHIL # BLD AUTO: 0.07 10*3/MM3 (ref 0–0.4)
EOSINOPHIL NFR BLD AUTO: 0.5 % (ref 0.3–6.2)
ERYTHROCYTE [DISTWIDTH] IN BLOOD BY AUTOMATED COUNT: 15.7 % (ref 12.3–15.4)
HCT VFR BLD AUTO: 36.1 % (ref 34–46.6)
HGB BLD-MCNC: 12.1 G/DL (ref 12–15.9)
IMM GRANULOCYTES # BLD AUTO: 0.1 10*3/MM3 (ref 0–0.05)
IMM GRANULOCYTES NFR BLD AUTO: 0.7 % (ref 0–0.5)
LYMPHOCYTES # BLD AUTO: 2.95 10*3/MM3 (ref 0.7–3.1)
LYMPHOCYTES NFR BLD AUTO: 19.7 % (ref 19.6–45.3)
MCH RBC QN AUTO: 28.5 PG (ref 26.6–33)
MCHC RBC AUTO-ENTMCNC: 33.5 G/DL (ref 31.5–35.7)
MCV RBC AUTO: 84.9 FL (ref 79–97)
MONOCYTES # BLD AUTO: 0.78 10*3/MM3 (ref 0.1–0.9)
MONOCYTES NFR BLD AUTO: 5.2 % (ref 5–12)
NEUTROPHILS NFR BLD AUTO: 10.99 10*3/MM3 (ref 1.7–7)
NEUTROPHILS NFR BLD AUTO: 73.6 % (ref 42.7–76)
NRBC BLD AUTO-RTO: 0 /100 WBC (ref 0–0.2)
PLATELET # BLD AUTO: 341 10*3/MM3 (ref 140–450)
PMV BLD AUTO: 10.9 FL (ref 6–12)
RBC # BLD AUTO: 4.25 10*6/MM3 (ref 3.77–5.28)
RH BLD: POSITIVE
SARS-COV-2 RNA PNL SPEC NAA+PROBE: NOT DETECTED
T&S EXPIRATION DATE: NORMAL
WBC # BLD AUTO: 14.94 10*3/MM3 (ref 3.4–10.8)

## 2021-06-11 PROCEDURE — 87635 SARS-COV-2 COVID-19 AMP PRB: CPT | Performed by: OBSTETRICS & GYNECOLOGY

## 2021-06-11 PROCEDURE — C1755 CATHETER, INTRASPINAL: HCPCS

## 2021-06-11 PROCEDURE — 25010000002 ROPIVACAINE PER 1 MG: Performed by: ANESTHESIOLOGY

## 2021-06-11 PROCEDURE — 85025 COMPLETE CBC W/AUTO DIFF WBC: CPT | Performed by: OBSTETRICS & GYNECOLOGY

## 2021-06-11 PROCEDURE — 86900 BLOOD TYPING SEROLOGIC ABO: CPT | Performed by: OBSTETRICS & GYNECOLOGY

## 2021-06-11 PROCEDURE — C1755 CATHETER, INTRASPINAL: HCPCS | Performed by: ANESTHESIOLOGY

## 2021-06-11 PROCEDURE — 86901 BLOOD TYPING SEROLOGIC RH(D): CPT | Performed by: OBSTETRICS & GYNECOLOGY

## 2021-06-11 PROCEDURE — 25010000002 FENTANYL CITRATE (PF) 2500 MCG/50ML SOLUTION: Performed by: ANESTHESIOLOGY

## 2021-06-11 PROCEDURE — 3E033VJ INTRODUCTION OF OTHER HORMONE INTO PERIPHERAL VEIN, PERCUTANEOUS APPROACH: ICD-10-PCS | Performed by: OBSTETRICS & GYNECOLOGY

## 2021-06-11 PROCEDURE — 86850 RBC ANTIBODY SCREEN: CPT | Performed by: OBSTETRICS & GYNECOLOGY

## 2021-06-11 RX ORDER — ONDANSETRON 2 MG/ML
4 INJECTION INTRAMUSCULAR; INTRAVENOUS ONCE AS NEEDED
Status: COMPLETED | OUTPATIENT
Start: 2021-06-11 | End: 2021-06-12

## 2021-06-11 RX ORDER — BUPIVACAINE HYDROCHLORIDE 2.5 MG/ML
INJECTION, SOLUTION EPIDURAL; INFILTRATION; INTRACAUDAL AS NEEDED
Status: DISCONTINUED | OUTPATIENT
Start: 2021-06-11 | End: 2021-06-12 | Stop reason: SURG

## 2021-06-11 RX ORDER — LIDOCAINE HYDROCHLORIDE AND EPINEPHRINE 15; 5 MG/ML; UG/ML
INJECTION, SOLUTION EPIDURAL AS NEEDED
Status: DISCONTINUED | OUTPATIENT
Start: 2021-06-11 | End: 2021-06-12 | Stop reason: SURG

## 2021-06-11 RX ORDER — SODIUM CHLORIDE, SODIUM LACTATE, POTASSIUM CHLORIDE, CALCIUM CHLORIDE 600; 310; 30; 20 MG/100ML; MG/100ML; MG/100ML; MG/100ML
125 INJECTION, SOLUTION INTRAVENOUS CONTINUOUS
Status: DISCONTINUED | OUTPATIENT
Start: 2021-06-11 | End: 2021-06-15 | Stop reason: HOSPADM

## 2021-06-11 RX ORDER — BUPIVACAINE HYDROCHLORIDE AND EPINEPHRINE 5; 5 MG/ML; UG/ML
INJECTION, SOLUTION EPIDURAL; INTRACAUDAL; PERINEURAL
Status: DISPENSED
Start: 2021-06-11 | End: 2021-06-12

## 2021-06-11 RX ORDER — OXYTOCIN-SODIUM CHLORIDE 0.9% IV SOLN 30 UNIT/500ML 30-0.9/5 UT/ML-%
2 SOLUTION INTRAVENOUS
Status: DISCONTINUED | OUTPATIENT
Start: 2021-06-11 | End: 2021-06-15 | Stop reason: HOSPADM

## 2021-06-11 RX ORDER — FAMOTIDINE 10 MG/ML
20 INJECTION, SOLUTION INTRAVENOUS ONCE AS NEEDED
Status: COMPLETED | OUTPATIENT
Start: 2021-06-11 | End: 2021-06-12

## 2021-06-11 RX ORDER — EPHEDRINE SULFATE 50 MG/ML
5 INJECTION, SOLUTION INTRAVENOUS AS NEEDED
Status: DISCONTINUED | OUTPATIENT
Start: 2021-06-11 | End: 2021-06-12 | Stop reason: HOSPADM

## 2021-06-11 RX ORDER — MAGNESIUM CARB/ALUMINUM HYDROX 105-160MG
30 TABLET,CHEWABLE ORAL ONCE
Status: DISCONTINUED | OUTPATIENT
Start: 2021-06-11 | End: 2021-06-12 | Stop reason: HOSPADM

## 2021-06-11 RX ORDER — DIPHENHYDRAMINE HYDROCHLORIDE 50 MG/ML
12.5 INJECTION INTRAMUSCULAR; INTRAVENOUS EVERY 8 HOURS PRN
Status: DISCONTINUED | OUTPATIENT
Start: 2021-06-11 | End: 2021-06-12 | Stop reason: HOSPADM

## 2021-06-11 RX ADMIN — EPHEDRINE SULFATE 5 MG: 50 INJECTION INTRAVENOUS at 23:22

## 2021-06-11 RX ADMIN — ROPIVACAINE HYDROCHLORIDE: 2 INJECTION, SOLUTION EPIDURAL; INFILTRATION at 17:04

## 2021-06-11 RX ADMIN — LIDOCAINE HYDROCHLORIDE AND EPINEPHRINE 3.5 ML: 15; 5 INJECTION, SOLUTION EPIDURAL at 11:25

## 2021-06-11 RX ADMIN — SODIUM CHLORIDE, POTASSIUM CHLORIDE, SODIUM LACTATE AND CALCIUM CHLORIDE 125 ML/HR: 600; 310; 30; 20 INJECTION, SOLUTION INTRAVENOUS at 07:35

## 2021-06-11 RX ADMIN — SODIUM CHLORIDE, POTASSIUM CHLORIDE, SODIUM LACTATE AND CALCIUM CHLORIDE 999 ML/HR: 600; 310; 30; 20 INJECTION, SOLUTION INTRAVENOUS at 16:12

## 2021-06-11 RX ADMIN — ROPIVACAINE HYDROCHLORIDE 10 ML/HR: 2 INJECTION, SOLUTION EPIDURAL; INFILTRATION at 11:30

## 2021-06-11 RX ADMIN — EPHEDRINE SULFATE 5 MG: 50 INJECTION INTRAVENOUS at 11:59

## 2021-06-11 RX ADMIN — ROPIVACAINE HYDROCHLORIDE: 2 INJECTION, SOLUTION EPIDURAL; INFILTRATION at 23:28

## 2021-06-11 RX ADMIN — SODIUM CHLORIDE, POTASSIUM CHLORIDE, SODIUM LACTATE AND CALCIUM CHLORIDE 125 ML/HR: 600; 310; 30; 20 INJECTION, SOLUTION INTRAVENOUS at 21:29

## 2021-06-11 RX ADMIN — EPHEDRINE SULFATE 5 MG: 50 INJECTION INTRAVENOUS at 12:01

## 2021-06-11 RX ADMIN — SODIUM CHLORIDE, POTASSIUM CHLORIDE, SODIUM LACTATE AND CALCIUM CHLORIDE 999 ML/HR: 600; 310; 30; 20 INJECTION, SOLUTION INTRAVENOUS at 11:36

## 2021-06-11 RX ADMIN — OXYTOCIN 2 MILLI-UNITS/MIN: 10 INJECTION INTRAVENOUS at 08:36

## 2021-06-11 RX ADMIN — BUPIVACAINE HYDROCHLORIDE 10 ML: 2.5 INJECTION, SOLUTION EPIDURAL; INFILTRATION; INTRACAUDAL; PERINEURAL at 18:08

## 2021-06-11 NOTE — ANESTHESIA PREPROCEDURE EVALUATION
Anesthesia Evaluation     Patient summary reviewed and Nursing notes reviewed   History of anesthetic complications: never had GA. No FHx of GA complications.  NPO Solid Status: > 8 hours  NPO Liquid Status: > 2 hours           Airway   Mallampati: III  TM distance: >3 FB  Possible difficult intubation and Large neck circumference  Dental - normal exam     Pulmonary    Cardiovascular         Neuro/Psych  GI/Hepatic/Renal/Endo    (+) obesity, morbid obesity,      Musculoskeletal     Abdominal    Substance History      OB/GYN    (+) Pregnant,         Other        ROS/Med Hx Other: Post dates  Fetal macrosomia by u/s  Morbid obesity                Anesthesia Plan    ASA 3     epidural   (40w6d    I have reviewed the patient's history with the patient and the chart, including all pertinent laboratory results and imaging. I have explained the risks of epidural anesthesia including but not limited to hypotension, PDPH, nerve injury, and risk of failure/replacement. Patient understands risks and agrees to proceed.  )    Anesthetic plan, all risks, benefits, and alternatives have been provided, discussed and informed consent has been obtained with: patient.

## 2021-06-11 NOTE — PLAN OF CARE
Goal Outcome Evaluation:  Plan of Care Reviewed With: patient        Progress: improving  Outcome Summary: Pt making some cervical change thoughout the day, now 3-4 cm dilated, some issues with pain control with epidural, buluses given per anesthesia.

## 2021-06-11 NOTE — ANESTHESIA PROCEDURE NOTES
Labor Epidural      Patient reassessed immediately prior to procedure    Patient location during procedure: OB  Performed By  Anesthesiologist: Reinier German MD  Preanesthetic Checklist  Completed: patient identified, IV checked, site marked, risks and benefits discussed, surgical consent, monitors and equipment checked, pre-op evaluation and timeout performed  Additional Notes  Pt monitored by OB RN staff  Test dose -- 3cc 1.5% lidocaine with epi -- neg for HR change, neg for SAB      I replaced her previous epidural that is no longer working.  Prep:  Pt Position:sitting  Sterile Tech:cap, gloves, mask and sterile barrier  Prep:chlorhexidine gluconate and isopropyl alcohol  Monitoring:blood pressure monitoring, continuous pulse oximetry and EKG  Epidural Block Procedure:  Approach:midline  Guidance:palpation technique  Location:L4-L5  Needle Type:Tuohy  Needle Gauge:17  Loss of Resistance Medium: saline  Paresthesia: none  Aspiration:negative  Test Dose:negative  Number of Attempts: 1  Post Assessment:  Dressing:occlusive dressing applied and secured with tape  Pt Tolerance:patient tolerated the procedure well with no apparent complications  Complications:no

## 2021-06-11 NOTE — ANESTHESIA PROCEDURE NOTES
Labor Epidural      Patient reassessed immediately prior to procedure    Patient location during procedure: OB  Performed By  Anesthesiologist: Shea Dowell MD  Preanesthetic Checklist  Completed: patient identified, IV checked, site marked, risks and benefits discussed, surgical consent, monitors and equipment checked, pre-op evaluation and timeout performed  Prep:  Pt Position:sitting  Sterile Tech:cap, gloves, gown, mask and sterile barrier  Prep:chlorhexidine gluconate and isopropyl alcohol  Monitoring:blood pressure monitoring, continuous pulse oximetry and EKG  Epidural Block Procedure:  Approach:midline  Guidance:landmark technique and palpation technique  Location:L3-L4  Needle Type:Tuohy  Needle Gauge:17 G  Loss of Resistance Medium: saline  Loss of Resistance: 10cm  Cath Depth at skin:15 cm  Paresthesia: none  Aspiration:negative  Test Dose:negative  Number of Attempts: 1  Post Assessment:  Dressing:occlusive dressing applied and secured with tape  Pt Tolerance:patient tolerated the procedure well with no apparent complications  Complications:no

## 2021-06-11 NOTE — PROGRESS NOTES
Patient had prolonged deceleration after placement of epidural.  She had some associated hypotension and did receive ephedrine.  Pitocin was stopped and intrauterine resuscitation measures started.  Fetal heart tones improved to 120s after approximately 9 minutes.  FSE placed by myself and cervix now 3 cm/50%/-2.  Nursing given orders to restart pitocin at 4 mUnits if fetal heart rate is reassuring for 30 minutes.  Explained to patient that if decelerations continue to occur, we will need to proceed with  section.

## 2021-06-11 NOTE — H&P
[facility]  Obstetric History and Physical    Chief Complaint   Patient presents with   • Scheduled Induction     Pt here for scheduled term induction, denies complications with this pregnancy.        Subjective     Patient is a 26 y.o. female  currently at 40w6d, who presents for IOL for post dates.  Pregnancy has been complicated by maternal obesity and recent ultrasound showing possible LGA fetus.  Ultrasound on  showed EFW of 9#6oz but ultrasound on  showed appropriate growth with AC at 26%.      The following portions of the patients history were reviewed and updated as appropriate: current medications, allergies, past medical history, past surgical history, past family history, past social history and problem list .       Prenatal Information:  Prenatal Results     POC Urine Glucose/Protein     Test Value Reference Range Date Time    Urine Glucose ^ Negative  Negative, 1000 mg/dL (3+) 21     Urine Protein ^ Negative  Negative 21           Initial Prenatal Labs     Test Value Reference Range Date Time    Hemoglobin  11.1 g/dL 12.0 - 16.0 20 1950       11.3 g/dL 11.1 - 15.9 20 1347    Hematocrit  34.5 % 37.0 - 47.0 20 1950       34.5 % 34.0 - 46.6 20 1347    Platelets  341 10*3/mm3 140 - 450 21 0735       290 10*3/mm3 140 - 450 21 1122       296 10*3/uL 130 - 400 20 1950       357 x10E3/uL 150 - 450 20 1347    Rubella IgG  3.77 index Immune >0.99 20 1347    Hepatitis B SAg  Negative  Negative 20 1347    Hepatitis C Ab  <0.1 s/co ratio 0.0 - 0.9 20 1347    RPR  Non Reactive  Non Reactive 20 1347    ABO  O   21 0735    Rh  Positive   21 0735    Antibody Screen  Negative  Negative 20 1347    HIV  Non Reactive  Non Reactive 20 1347    Urine Culture  No Uropathogens Isolated.   20       Final report   20 0335    Gonorrhea        Chlamydia        TSH              2nd and 3rd  Trimester     Test Value Reference Range Date Time    Hemoglobin (repeated)  12.1 g/dL 12.0 - 15.9 06/11/21 0735       10.7 g/dL 12.0 - 15.9 03/19/21 1122    Hematocrit (repeated)  36.1 % 34.0 - 46.6 06/11/21 0735       32.7 % 34.0 - 46.6 03/19/21 1122    GCT  103 mg/dL 65 - 139 03/19/21 1122    Antibody Screen (repeated)  Negative   06/11/21 0735    GTT Fasting        GTT 1 Hr        GTT 2 Hr        GTT 3 Hr        Group B Strep  Negative  Negative 05/14/21 1232          Drug Screening     Test Value Reference Range Date Time    Amphetamine Screen        Barbiturate Screen        Benzodiazepine Screen        Methadone Screen        Phencyclidine Screen        Opiates Screen        THC Screen        Cocaine Screen        Propoxyphene Screen        Buprenorphine Screen        Methamphetamine Screen        Oxycodone Screen        Tricyclic Antidepressants Screen              Other (Risk screening)     Test Value Reference Range Date Time    Varicella IgG        Parvovirus IgG        CMV IgG        Cystic Fibrosis        Hemoglobin electrophoresis        NIPT        MSAFP-4        AFP (for NTD only)  *Screen Negative*   01/15/21 1318          Legend    ^: Historical                      External Prenatal Results     Pregnancy Outside Results - Transcribed From Office Records - See Scanned Records For Details     Test Value Date Time    ABO  O  06/11/21 0735    Rh  Positive  06/11/21 0735    Antibody Screen  Negative  06/11/21 0735       Negative  11/09/20 1347    Varicella IgG       Rubella  3.77 index 11/09/20 1347    Hgb  12.1 g/dL 06/11/21 0735       10.7 g/dL 03/19/21 1122       11.1 g/dL 12/21/20 1950       11.3 g/dL 11/09/20 1347    Hct  36.1 % 06/11/21 0735       32.7 % 03/19/21 1122       34.5 % 12/21/20 1950       34.5 % 11/09/20 1347    Glucose Fasting GTT       Glucose Tolerance Test 1 hour       Glucose Tolerance Test 3 hour       Gonorrhea (discrete)       Chlamydia (discrete)       RPR  Non Reactive   20 1347    VDRL       Syphilis Antibody       HBsAg  Negative  20 1347    Herpes Simplex Virus PCR       Herpes Simplex VIrus Culture       HIV  Non Reactive  20 1347    Hep C RNA Quant PCR       Hep C Antibody  <0.1 s/co ratio 20 1347    AFP  34.0 ng/mL 01/15/21 1318    Group B Strep  Negative  21 1232    GBS Susceptibility to Clindamycin       GBS Susceptibility to Erythromycin       Fetal Fibronectin       Genetic Testing, Maternal Blood             Drug Screening     Test Value Date Time    Urine Drug Screen       Amphetamine Screen       Barbiturate Screen       Benzodiazepine Screen       Methadone Screen       Phencyclidine Screen       Opiates Screen       THC Screen       Cocaine Screen       Propoxyphene Screen       Buprenorphine Screen       Methamphetamine Screen       Oxycodone Screen       Tricyclic Antidepressants Screen             Legend    ^: Historical                         Past OB History:     OB History    Para Term  AB Living   1 0 0 0 0 0   SAB TAB Ectopic Molar Multiple Live Births   0 0 0 0 0 0      # Outcome Date GA Lbr Manoj/2nd Weight Sex Delivery Anes PTL Lv   1 Current                Past Medical History: No past medical history on file.   Past Surgical History No past surgical history on file.   Family History: Family History   Problem Relation Age of Onset   • Depression Mother    • Diabetes Father    • Heart disease Father    • Depression Father    • No Known Problems Sister    • Rheum arthritis Brother    • Stroke Maternal Grandmother         also had head trauma   • No Known Problems Maternal Grandfather    • No Known Problems Paternal Grandmother    • Melanoma Paternal Grandfather    • Diabetes Paternal Grandfather    • No Known Problems Brother       Social History:  reports that she has never smoked. She has never used smokeless tobacco.   reports no history of alcohol use.   reports no history of drug use.        General ROS:  Pertinent items are noted in HPI, all other systems reviewed and negative    Objective       Vital Signs Range for the last 24 hours  Temperature: Temp:  [97.4 °F (36.3 °C)] 97.4 °F (36.3 °C)   Temp Source: Temp src: Oral   BP: BP: (116-129)/(77-82) 129/77   Pulse: Heart Rate:  [91-96] 91   Respirations: Resp:  [18] 18   SPO2: SpO2:  [98 %] 98 %   O2 Amount (l/min):     O2 Devices     Weight: Weight:  [142 kg (312 lb 12.8 oz)] 142 kg (312 lb 12.8 oz)     Physical Examination: General appearance - alert, well appearing, and in no distress  Chest - clear to auscultation, no wheezes, rales or rhonchi, symmetric air entry  Heart - normal rate and regular rhythm  Abdomen - gravid, soft, nontender, EFW 8#  Pelvic - Cervix 2 cm/50%/-2  AROM performed with return of clear fluid.  IUPC placed without difficulty  Extremities - pedal edema 1 +    Presentation: vertex   Cervix: Exam by: Method: sterile exam per physician   Dilation: Cervical Dilation (cm): 2   Effacement: Cervical Effacement: 50%   Station:         Fetal Heart Rate Assessment   Method: Fetal HR Assessment Method: external   Beats/min: Fetal HR (beats/min): 135   Baseline: Fetal Heart Baseline Rate: normal range   Variability: Fetal HR Variability: minimal (detectable, amplitude less than or equal to 5 bpm)   Accels: Fetal HR Accelerations: absent   Decels: Fetal HR Decelerations: absent   Tracing Category:       Uterine Assessment   Method: Method: palpation, external tocotransducer   Frequency (min): Contraction Frequency (Minutes): 2-4   Ctx Count in 10 min:     Duration:     Intensity: Contraction Intensity: mild by palpation   Intensity by IUPC:     Resting Tone: Uterine Resting Tone: soft by palpation   Resting Tone by IUPC:     [place] Units:         Assessment/Plan       Maternal obesity affecting pregnancy, antepartum    Excessive fetal growth affecting management of pregnancy in third trimester, single or unspecified fetus     Pregnancy        Assessment:  1.  Intrauterine pregnancy at 40w6d gestation with reassuring fetal status.    2.  induction of labor  for maternal obesity and post dates  with favorable cervix  3.  Obstetrical history significant for obesity and LGA fetus.  4.  GBS status:   Strep Gp B Culture   Date Value Ref Range Status   2021 Negative Negative Final     Comment:     Centers for Disease Control and Prevention (CDC) and American Congress  of Obstetricians and Gynecologists (ACOG) guidelines for prevention of   group B streptococcal (GBS) disease specify co-collection of  a vaginal and rectal swab specimen to maximize sensitivity of GBS  detection. Per the CDC and ACOG, swabbing both the lower vagina and  rectum substantially increases the yield of detection compared with  sampling the vagina alone.  Penicillin G, ampicillin, or cefazolin are indicated for intrapartum  prophylaxis of  GBS colonization. Reflex susceptibility  testing should be performed prior to use of clindamycin only on GBS  isolates from penicillin-allergic women who are considered a high risk  for anaphylaxis. Treatment with vancomycin without additional testing  is warranted if resistance to clindamycin is noted.         Plan:  1. fetal and uterine monitoring  continuously, labor augmentation  Pitocin and analgesia with  epidural  2. Plan of care has been reviewed with patient and family.  3.  Risks, benefits of treatment plan have been discussed.  4.  All questions have been answered.  5.  Patient desires trial of labor and understands if EFW is LGA it does increase her risk of  section.      Clarita Hermosillo MD  2021  10:16 EDT

## 2021-06-12 PROBLEM — Z98.891 S/P CESAREAN SECTION: Status: ACTIVE | Noted: 2021-06-12

## 2021-06-12 PROCEDURE — 25010000002 PHENYLEPHRINE PER 1 ML: Performed by: NURSE ANESTHETIST, CERTIFIED REGISTERED

## 2021-06-12 PROCEDURE — 25010000002 HYDROMORPHONE PER 4 MG: Performed by: NURSE ANESTHETIST, CERTIFIED REGISTERED

## 2021-06-12 PROCEDURE — 25010000002 AZITHROMYCIN 500 MG/250 ML: Performed by: OBSTETRICS & GYNECOLOGY

## 2021-06-12 PROCEDURE — 25010000002 CEFAZOLIN PER 500 MG: Performed by: OBSTETRICS & GYNECOLOGY

## 2021-06-12 PROCEDURE — 25010000002 MORPHINE PER 10 MG: Performed by: NURSE ANESTHETIST, CERTIFIED REGISTERED

## 2021-06-12 PROCEDURE — 88307 TISSUE EXAM BY PATHOLOGIST: CPT

## 2021-06-12 PROCEDURE — 25010000002 ONDANSETRON PER 1 MG: Performed by: ANESTHESIOLOGY

## 2021-06-12 PROCEDURE — 59515 CESAREAN DELIVERY: CPT | Performed by: OBSTETRICS & GYNECOLOGY

## 2021-06-12 PROCEDURE — S0260 H&P FOR SURGERY: HCPCS | Performed by: OBSTETRICS & GYNECOLOGY

## 2021-06-12 RX ORDER — DIPHENHYDRAMINE HYDROCHLORIDE 50 MG/ML
25 INJECTION INTRAMUSCULAR; INTRAVENOUS EVERY 4 HOURS PRN
Status: DISCONTINUED | OUTPATIENT
Start: 2021-06-12 | End: 2021-06-15 | Stop reason: HOSPADM

## 2021-06-12 RX ORDER — DIPHENHYDRAMINE HCL 25 MG
25 CAPSULE ORAL EVERY 4 HOURS PRN
Status: DISCONTINUED | OUTPATIENT
Start: 2021-06-12 | End: 2021-06-15 | Stop reason: HOSPADM

## 2021-06-12 RX ORDER — CARBOPROST TROMETHAMINE 250 UG/ML
250 INJECTION, SOLUTION INTRAMUSCULAR AS NEEDED
Status: DISCONTINUED | OUTPATIENT
Start: 2021-06-12 | End: 2021-06-12 | Stop reason: HOSPADM

## 2021-06-12 RX ORDER — DIPHENHYDRAMINE HCL 25 MG
25 CAPSULE ORAL EVERY 4 HOURS PRN
Status: CANCELLED | OUTPATIENT
Start: 2021-06-12

## 2021-06-12 RX ORDER — ONDANSETRON 4 MG/1
4 TABLET, FILM COATED ORAL EVERY 8 HOURS PRN
Status: CANCELLED | OUTPATIENT
Start: 2021-06-12

## 2021-06-12 RX ORDER — CEFAZOLIN SODIUM IN 0.9 % NACL 3 G/100 ML
3 INTRAVENOUS SOLUTION, PIGGYBACK (ML) INTRAVENOUS ONCE
Status: COMPLETED | OUTPATIENT
Start: 2021-06-12 | End: 2021-06-12

## 2021-06-12 RX ORDER — OXYTOCIN-SODIUM CHLORIDE 0.9% IV SOLN 30 UNIT/500ML 30-0.9/5 UT/ML-%
250 SOLUTION INTRAVENOUS CONTINUOUS PRN
Status: ACTIVE | OUTPATIENT
Start: 2021-06-12 | End: 2021-06-12

## 2021-06-12 RX ORDER — PRENATAL VIT/IRON FUM/FOLIC AC 27MG-0.8MG
1 TABLET ORAL DAILY
Status: DISCONTINUED | OUTPATIENT
Start: 2021-06-12 | End: 2021-06-15 | Stop reason: HOSPADM

## 2021-06-12 RX ORDER — ACETAMINOPHEN 500 MG
1000 TABLET ORAL ONCE
Status: COMPLETED | OUTPATIENT
Start: 2021-06-12 | End: 2021-06-12

## 2021-06-12 RX ORDER — IBUPROFEN 800 MG/1
800 TABLET ORAL EVERY 8 HOURS PRN
Status: DISCONTINUED | OUTPATIENT
Start: 2021-06-12 | End: 2021-06-15 | Stop reason: HOSPADM

## 2021-06-12 RX ORDER — HYDROMORPHONE HYDROCHLORIDE 1 MG/ML
0.5 INJECTION, SOLUTION INTRAMUSCULAR; INTRAVENOUS; SUBCUTANEOUS
Status: DISCONTINUED | OUTPATIENT
Start: 2021-06-12 | End: 2021-06-12 | Stop reason: HOSPADM

## 2021-06-12 RX ORDER — OXYTOCIN-SODIUM CHLORIDE 0.9% IV SOLN 30 UNIT/500ML 30-0.9/5 UT/ML-%
999 SOLUTION INTRAVENOUS ONCE
Status: COMPLETED | OUTPATIENT
Start: 2021-06-12 | End: 2021-06-12

## 2021-06-12 RX ORDER — NALOXONE HCL 0.4 MG/ML
0.2 VIAL (ML) INJECTION
Status: CANCELLED | OUTPATIENT
Start: 2021-06-12

## 2021-06-12 RX ORDER — ONDANSETRON 2 MG/ML
4 INJECTION INTRAMUSCULAR; INTRAVENOUS ONCE AS NEEDED
Status: DISCONTINUED | OUTPATIENT
Start: 2021-06-12 | End: 2021-06-15 | Stop reason: HOSPADM

## 2021-06-12 RX ORDER — HYDROXYZINE 50 MG/1
50 TABLET, FILM COATED ORAL EVERY 6 HOURS PRN
Status: CANCELLED | OUTPATIENT
Start: 2021-06-12

## 2021-06-12 RX ORDER — MORPHINE SULFATE 2 MG/ML
2 INJECTION, SOLUTION INTRAMUSCULAR; INTRAVENOUS
Status: ACTIVE | OUTPATIENT
Start: 2021-06-12 | End: 2021-06-13

## 2021-06-12 RX ORDER — PHYTONADIONE 1 MG/.5ML
INJECTION, EMULSION INTRAMUSCULAR; INTRAVENOUS; SUBCUTANEOUS
Status: DISPENSED
Start: 2021-06-12 | End: 2021-06-12

## 2021-06-12 RX ORDER — PROMETHAZINE HYDROCHLORIDE 12.5 MG/1
12.5 TABLET ORAL EVERY 4 HOURS PRN
Status: DISCONTINUED | OUTPATIENT
Start: 2021-06-12 | End: 2021-06-15 | Stop reason: HOSPADM

## 2021-06-12 RX ORDER — FAMOTIDINE 10 MG/ML
20 INJECTION, SOLUTION INTRAVENOUS ONCE AS NEEDED
Status: DISCONTINUED | OUTPATIENT
Start: 2021-06-12 | End: 2021-06-12 | Stop reason: HOSPADM

## 2021-06-12 RX ORDER — MISOPROSTOL 200 UG/1
800 TABLET ORAL AS NEEDED
Status: DISCONTINUED | OUTPATIENT
Start: 2021-06-12 | End: 2021-06-12 | Stop reason: HOSPADM

## 2021-06-12 RX ORDER — ONDANSETRON 2 MG/ML
4 INJECTION INTRAMUSCULAR; INTRAVENOUS ONCE AS NEEDED
Status: DISCONTINUED | OUTPATIENT
Start: 2021-06-12 | End: 2021-06-12 | Stop reason: HOSPADM

## 2021-06-12 RX ORDER — LIDOCAINE HYDROCHLORIDE AND EPINEPHRINE 20; 5 MG/ML; UG/ML
INJECTION, SOLUTION EPIDURAL; INFILTRATION; INTRACAUDAL; PERINEURAL AS NEEDED
Status: DISCONTINUED | OUTPATIENT
Start: 2021-06-12 | End: 2021-06-12 | Stop reason: SURG

## 2021-06-12 RX ORDER — DOCUSATE SODIUM 100 MG/1
100 CAPSULE, LIQUID FILLED ORAL 2 TIMES DAILY PRN
Status: DISCONTINUED | OUTPATIENT
Start: 2021-06-12 | End: 2021-06-15 | Stop reason: HOSPADM

## 2021-06-12 RX ORDER — HYDROCORTISONE 25 MG/G
CREAM TOPICAL 3 TIMES DAILY PRN
Status: DISCONTINUED | OUTPATIENT
Start: 2021-06-12 | End: 2021-06-15 | Stop reason: HOSPADM

## 2021-06-12 RX ORDER — ONDANSETRON 2 MG/ML
4 INJECTION INTRAMUSCULAR; INTRAVENOUS EVERY 6 HOURS PRN
Status: CANCELLED | OUTPATIENT
Start: 2021-06-12

## 2021-06-12 RX ORDER — TRANEXAMIC ACID 100 MG/ML
INJECTION, SOLUTION INTRAVENOUS AS NEEDED
Status: DISCONTINUED | OUTPATIENT
Start: 2021-06-12 | End: 2021-06-12 | Stop reason: SURG

## 2021-06-12 RX ORDER — OXYCODONE HYDROCHLORIDE AND ACETAMINOPHEN 5; 325 MG/1; MG/1
1 TABLET ORAL EVERY 4 HOURS PRN
Status: DISCONTINUED | OUTPATIENT
Start: 2021-06-12 | End: 2021-06-15 | Stop reason: HOSPADM

## 2021-06-12 RX ORDER — OXYTOCIN-SODIUM CHLORIDE 0.9% IV SOLN 30 UNIT/500ML 30-0.9/5 UT/ML-%
125 SOLUTION INTRAVENOUS CONTINUOUS PRN
Status: COMPLETED | OUTPATIENT
Start: 2021-06-12 | End: 2021-06-12

## 2021-06-12 RX ORDER — ERYTHROMYCIN 5 MG/G
OINTMENT OPHTHALMIC
Status: DISPENSED
Start: 2021-06-12 | End: 2021-06-12

## 2021-06-12 RX ORDER — METHYLERGONOVINE MALEATE 0.2 MG/ML
200 INJECTION INTRAVENOUS ONCE AS NEEDED
Status: DISCONTINUED | OUTPATIENT
Start: 2021-06-12 | End: 2021-06-12 | Stop reason: HOSPADM

## 2021-06-12 RX ORDER — MORPHINE SULFATE 1 MG/ML
INJECTION, SOLUTION EPIDURAL; INTRATHECAL; INTRAVENOUS AS NEEDED
Status: DISCONTINUED | OUTPATIENT
Start: 2021-06-12 | End: 2021-06-12 | Stop reason: SURG

## 2021-06-12 RX ADMIN — IBUPROFEN 800 MG: 800 TABLET, FILM COATED ORAL at 06:30

## 2021-06-12 RX ADMIN — OXYCODONE AND ACETAMINOPHEN 1 TABLET: 5; 325 TABLET ORAL at 19:46

## 2021-06-12 RX ADMIN — Medication 1 TABLET: at 09:18

## 2021-06-12 RX ADMIN — OXYCODONE AND ACETAMINOPHEN 1 TABLET: 5; 325 TABLET ORAL at 06:30

## 2021-06-12 RX ADMIN — DOCUSATE SODIUM 100 MG: 100 CAPSULE, LIQUID FILLED ORAL at 09:18

## 2021-06-12 RX ADMIN — TRANEXAMIC ACID 1000 MG: 100 INJECTION, SOLUTION INTRAVENOUS at 01:22

## 2021-06-12 RX ADMIN — PHENYLEPHRINE HYDROCHLORIDE 100 MCG: 10 INJECTION INTRAVENOUS at 01:23

## 2021-06-12 RX ADMIN — PHENYLEPHRINE HYDROCHLORIDE 100 MCG: 10 INJECTION INTRAVENOUS at 01:42

## 2021-06-12 RX ADMIN — MORPHINE SULFATE 3 MG: 1 INJECTION, SOLUTION EPIDURAL; INTRATHECAL; INTRAVENOUS at 01:30

## 2021-06-12 RX ADMIN — FAMOTIDINE 20 MG: 10 INJECTION INTRAVENOUS at 00:50

## 2021-06-12 RX ADMIN — ACETAMINOPHEN 1000 MG: 500 TABLET, FILM COATED ORAL at 00:50

## 2021-06-12 RX ADMIN — HYDROMORPHONE HYDROCHLORIDE 0.5 MG: 1 INJECTION, SOLUTION INTRAMUSCULAR; INTRAVENOUS; SUBCUTANEOUS at 02:18

## 2021-06-12 RX ADMIN — LIDOCAINE HYDROCHLORIDE,EPINEPHRINE BITARTRATE 5 ML: 20; .005 INJECTION, SOLUTION EPIDURAL; INFILTRATION; INTRACAUDAL; PERINEURAL at 01:12

## 2021-06-12 RX ADMIN — ONDANSETRON 4 MG: 2 INJECTION INTRAMUSCULAR; INTRAVENOUS at 00:51

## 2021-06-12 RX ADMIN — OXYTOCIN 999 ML/HR: 10 INJECTION INTRAVENOUS at 01:28

## 2021-06-12 RX ADMIN — PHENYLEPHRINE HYDROCHLORIDE 100 MCG: 10 INJECTION INTRAVENOUS at 01:31

## 2021-06-12 RX ADMIN — OXYCODONE AND ACETAMINOPHEN 1 TABLET: 5; 325 TABLET ORAL at 15:43

## 2021-06-12 RX ADMIN — LIDOCAINE HYDROCHLORIDE,EPINEPHRINE BITARTRATE 5 ML: 20; .005 INJECTION, SOLUTION EPIDURAL; INFILTRATION; INTRACAUDAL; PERINEURAL at 01:10

## 2021-06-12 RX ADMIN — CEFAZOLIN SODIUM 3 G: 10 INJECTION, POWDER, FOR SOLUTION INTRAVENOUS at 00:50

## 2021-06-12 RX ADMIN — AZITHROMYCIN 500 MG: 500 INJECTION, POWDER, LYOPHILIZED, FOR SOLUTION INTRAVENOUS at 01:15

## 2021-06-12 RX ADMIN — OXYTOCIN 125 ML/HR: 10 INJECTION INTRAVENOUS at 02:39

## 2021-06-12 RX ADMIN — IBUPROFEN 800 MG: 800 TABLET, FILM COATED ORAL at 15:07

## 2021-06-12 RX ADMIN — MORPHINE SULFATE 3 MG: 1 INJECTION, SOLUTION EPIDURAL; INTRATHECAL; INTRAVENOUS at 02:01

## 2021-06-12 RX ADMIN — OXYCODONE AND ACETAMINOPHEN 1 TABLET: 5; 325 TABLET ORAL at 11:29

## 2021-06-12 NOTE — NURSING NOTE
QBL noted at 1284cc. No interventions required. Pt has had light to scant bleeding throughout her recovery.

## 2021-06-12 NOTE — ANESTHESIA POSTPROCEDURE EVALUATION
"Patient: Randi Martell    Procedure Summary     Date: 21 Room / Location:  [facility]    Anesthesia Start: 1115 Anesthesia Stop: 21    Procedure:  SECTION PRIMARY (N/A Abdomen) Diagnosis:     Surgeons: Clariat Hermosillo MD Provider: Shea Dowell MD    Anesthesia Type: Not recorded ASA Status: 3          Anesthesia Type: No value filed.    Vitals  Vitals Value Taken Time   BP 97/67 21 1445   Temp 36.7 °C (98.1 °F) 21 1445   Pulse 90 21 1445   Resp 18 21 1445   SpO2 96 % 21 1445           Post Anesthesia Care and Evaluation    Patient location during evaluation: bedside  Patient participation: complete - patient participated  Level of consciousness: awake and alert  Pain management: adequate  Airway patency: patent  Anesthetic complications: No anesthetic complications    Cardiovascular status: acceptable  Respiratory status: acceptable  Hydration status: acceptable    Comments: BP 97/67   Pulse 90   Temp 36.7 °C (98.1 °F) (Oral)   Resp 18   Ht 172.7 cm (68\")   Wt (!) 142 kg (312 lb 12.8 oz)   LMP 2020 (Exact Date)   SpO2 96%   Breastfeeding No   BMI 47.56 kg/m²           "

## 2021-06-12 NOTE — PROGRESS NOTES
Patient has made minimal cervical change over the last 12 hours.  Her cervix was checked by myself and is 3 to 4 cm, 70% effaced, -2 station.  She is also had several episodes of recurrent late decelerations when she is on her left side or in the thrown position.  The baby will only tolerate labor if she is on her right side.  Given that she is remote from delivery and making minimal cervical change with episodes of nonreassuring fetal heart tones, recommend proceeding with  section.  Patient made aware of risks, benefits, and alternatives of  section and agrees to proceed.

## 2021-06-12 NOTE — PLAN OF CARE
Problem: Adult Inpatient Plan of Care  Goal: Plan of Care Review  Outcome: Ongoing, Progressing  Flowsheets (Taken 2021 032)  Progress: improving  Plan of Care Reviewed With:   patient   spouse  Outcome Summary: Pt had a c/s and had a baby at 0127. Fetal monitor was showing recurrent lates in the monitor. Pt tolerated c/s well. Pt has a firm fundus with scant bleeding. Wound is open to air. Bottle feeding baby. Pain is a 1/10 at this time.  Goal: Patient-Specific Goal (Individualized)  Outcome: Ongoing, Progressing  Flowsheets (Taken 2021 0329)  Patient-Specific Goals (Include Timeframe): Healthy mom and baby at discharge  Individualized Care Needs: Keep pt informed of POC  Anxieties, Fears or Concerns: First time mom  Goal: Absence of Hospital-Acquired Illness or Injury  Outcome: Ongoing, Progressing  Intervention: Identify and Manage Fall Risk  Recent Flowsheet Documentation  Taken 2021 by Tressa Lu RN  Safety Promotion/Fall Prevention: safety round/check completed  Taken 2021 by Tressa Lu RN  Safety Promotion/Fall Prevention: safety round/check completed  Goal: Optimal Comfort and Wellbeing  Outcome: Ongoing, Progressing  Intervention: Provide Person-Centered Care  Recent Flowsheet Documentation  Taken 2021 by Tressa Lu RN  Trust Relationship/Rapport:   care explained   choices provided   empathic listening provided   emotional support provided   questions answered   questions encouraged   reassurance provided   thoughts/feelings acknowledged  Taken 2021 by Tressa Lu, RN  Trust Relationship/Rapport:   care explained   choices provided   emotional support provided   empathic listening provided   questions answered   questions encouraged   reassurance provided   thoughts/feelings acknowledged  Goal: Readiness for Transition of Care  Outcome: Ongoing, Progressing     Problem:  Fall Injury Risk  Goal: Absence of Fall,  Infant Drop and Related Injury  Outcome: Ongoing, Progressing  Intervention: Promote Injury-Free Environment  Recent Flowsheet Documentation  Taken 6/12/2021 0218 by Tressa Lu, RN  Safety Promotion/Fall Prevention: safety round/check completed  Taken 6/11/2021 1912 by Tressa Lu, RN  Safety Promotion/Fall Prevention: safety round/check completed     Problem: Skin Injury Risk Increased  Goal: Skin Health and Integrity  Outcome: Ongoing, Progressing

## 2021-06-12 NOTE — OP NOTE
Section Procedure Note    Indications: failure to progress: arrest of dilation and non-reassuring fetal status    Pre-operative Diagnosis: 1. 41 week 0 day pregnancy.  2. Maternal obesity  3. Arrest of dilation  4. Nonreassuring fetal heart tones    Post-operative Diagnosis: same    Surgeon: Clarita Hermosillo MD     Assistants: Assistant: Harshil Reece CSA was responsible for performing the following activities: Retraction, Suction, Irrigation and Delivery of Fetus and their skilled assistance was necessary for the success of this case.    Anesthesia: Spinal anesthesia    Procedure Details   The patient was seen in the Holding Room. The risks, benefits, complications, treatment options, and expected outcomes were discussed with the patient.  The patient concurred with the proposed plan, giving informed consent.  The site of surgery properly noted/marked. The patient was taken to Operating Room # 1, identified as Randi Martell and the procedure verified as  Delivery. A Time Out was held and the above information confirmed.    After induction of anesthesia, the patient was draped and prepped in the usual sterile manner. A Pfannenstiel incision was made and carried down through the subcutaneous tissue to the fascia. Fascial incision was made and extended transversely. The fascia was  from the underlying rectus tissue superiorly and inferiorly. The peritoneum was identified and entered. Peritoneal incision was extended longitudinally. The utero-vesical peritoneal reflection was incised transversely and the bladder flap was bluntly freed from the lower uterine segment. A low transverse uterine incision was made. Delivered from vertex presentation was a 4030 gram male with Apgar scores of 8 at one minute and 9 at five minutes. After the umbilical cord was clamped and cut cord blood was obtained for evaluation. The placenta was removed intact and appeared normal.  Uterus was exteriorized.   The uterine outline, tubes and ovaries appeared normal. The uterine incision was closed with running locked sutures of 0 Vicryl.  A large sinus was noted at the center of the hysterotomy.  2 figure-of-eight stitches of 0 Vicryl were placed around the sinus.  A second imbricating layer was then placed with 0 Vicryl in a running fashion.  Hemostasis was observed. Lavage was carried out until clear.  Uterus was returned to the patient's abdomen.  Hysterotomy was revisualized and remained hemostatic.  The muscle and peritoneum were reapproximated with 2 figure-of-eight stitches of 3-0 chromic.  The fascia was then reapproximated with running sutures of 0 Vicryl.  The subcutaneous layer was reapproximated with 3-0 Vicryl in a running fashion.  The skin was reapproximated with 4-0 Monocryl and Dermabond.    Instrument, sponge, and needle counts were correct prior the abdominal closure and at the conclusion of the case.     Findings:  Normal pelvic anatomy    Quantitative Blood Loss:  1284 mL           Drains: Mckeon, draining clear urine           Specimens: Placenta to path           Implants: None           Complications:  None; patient tolerated the procedure well.           Disposition: PACU - hemodynamically stable.           Condition: stable    Attending Attestation: I was present and scrubbed for the entire procedure.

## 2021-06-13 LAB
BASOPHILS # BLD AUTO: 0.06 10*3/MM3 (ref 0–0.2)
BASOPHILS NFR BLD AUTO: 0.4 % (ref 0–1.5)
DEPRECATED RDW RBC AUTO: 45.1 FL (ref 37–54)
EOSINOPHIL # BLD AUTO: 0.01 10*3/MM3 (ref 0–0.4)
EOSINOPHIL NFR BLD AUTO: 0.1 % (ref 0.3–6.2)
ERYTHROCYTE [DISTWIDTH] IN BLOOD BY AUTOMATED COUNT: 14.7 % (ref 12.3–15.4)
HCT VFR BLD AUTO: 21.8 % (ref 34–46.6)
HGB BLD-MCNC: 7.3 G/DL (ref 12–15.9)
IMM GRANULOCYTES # BLD AUTO: 0.13 10*3/MM3 (ref 0–0.05)
IMM GRANULOCYTES NFR BLD AUTO: 0.9 % (ref 0–0.5)
LYMPHOCYTES # BLD AUTO: 2.12 10*3/MM3 (ref 0.7–3.1)
LYMPHOCYTES NFR BLD AUTO: 15.1 % (ref 19.6–45.3)
MCH RBC QN AUTO: 28.2 PG (ref 26.6–33)
MCHC RBC AUTO-ENTMCNC: 33.5 G/DL (ref 31.5–35.7)
MCV RBC AUTO: 84.2 FL (ref 79–97)
MONOCYTES # BLD AUTO: 0.85 10*3/MM3 (ref 0.1–0.9)
MONOCYTES NFR BLD AUTO: 6.1 % (ref 5–12)
NEUTROPHILS NFR BLD AUTO: 10.85 10*3/MM3 (ref 1.7–7)
NEUTROPHILS NFR BLD AUTO: 77.4 % (ref 42.7–76)
NRBC BLD AUTO-RTO: 0 /100 WBC (ref 0–0.2)
PLATELET # BLD AUTO: 167 10*3/MM3 (ref 140–450)
PMV BLD AUTO: 10.5 FL (ref 6–12)
RBC # BLD AUTO: 2.59 10*6/MM3 (ref 3.77–5.28)
WBC # BLD AUTO: 14.02 10*3/MM3 (ref 3.4–10.8)

## 2021-06-13 PROCEDURE — 0503F POSTPARTUM CARE VISIT: CPT | Performed by: OBSTETRICS & GYNECOLOGY

## 2021-06-13 PROCEDURE — 85025 COMPLETE CBC W/AUTO DIFF WBC: CPT | Performed by: OBSTETRICS & GYNECOLOGY

## 2021-06-13 RX ADMIN — Medication 1 TABLET: at 07:39

## 2021-06-13 RX ADMIN — IBUPROFEN 800 MG: 800 TABLET, FILM COATED ORAL at 17:11

## 2021-06-13 RX ADMIN — IBUPROFEN 800 MG: 800 TABLET, FILM COATED ORAL at 07:39

## 2021-06-13 RX ADMIN — DOCUSATE SODIUM 100 MG: 100 CAPSULE, LIQUID FILLED ORAL at 22:19

## 2021-06-13 RX ADMIN — OXYCODONE AND ACETAMINOPHEN 1 TABLET: 5; 325 TABLET ORAL at 17:11

## 2021-06-13 RX ADMIN — IBUPROFEN 800 MG: 800 TABLET, FILM COATED ORAL at 00:26

## 2021-06-13 RX ADMIN — OXYCODONE AND ACETAMINOPHEN 1 TABLET: 5; 325 TABLET ORAL at 07:39

## 2021-06-13 RX ADMIN — DOCUSATE SODIUM 100 MG: 100 CAPSULE, LIQUID FILLED ORAL at 07:39

## 2021-06-13 RX ADMIN — OXYCODONE AND ACETAMINOPHEN 1 TABLET: 5; 325 TABLET ORAL at 22:19

## 2021-06-13 RX ADMIN — OXYCODONE AND ACETAMINOPHEN 1 TABLET: 5; 325 TABLET ORAL at 12:47

## 2021-06-13 RX ADMIN — OXYCODONE AND ACETAMINOPHEN 1 TABLET: 5; 325 TABLET ORAL at 00:26

## 2021-06-13 NOTE — PROGRESS NOTES
Section Progress Note    Assessment/Plan     Status post  section: Doing well postoperatively.     Postop care--awaiting flatus.  Continue routine postop care.  Anemia--secondary to acute blood loss at surgery.  She is asymptomatic.  Will recheck CBC in am.    Rh status: O pos  Rubella: Immune  Gender: male--desires circumcision.  R/B/A d/w parents and they agree to proceed.    Subjective     Postpartum Day 1:  Delivery    The patient feels well. The patient denies emotional concerns. Pain is well controlled with current medications. The baby iswell.Urinary output is adequate. The patient is ambulating well. The patient is tolerating a normal diet. Patient denies flatus.    Objective     Vital signs in last 24 hours:  Temp:  [97.4 °F (36.3 °C)-98.5 °F (36.9 °C)] 98.4 °F (36.9 °C)  Heart Rate:  [] 86  Resp:  [17-18] 17  BP: ()/(60-78) 106/66      General:    alert, appears stated age and cooperative   Bowel Sounds:  active   Lochia:  appropriate   Uterine Fundus:   firm   Incision:  healing well, no significant drainage, no dehiscence, no significant erythema   DVT Evaluation:  No evidence of DVT seen on physical exam.     Lab Results   Component Value Date    WBC 14.02 (H) 2021    HGB 7.3 (L) 2021    HCT 21.8 (L) 2021    MCV 84.2 2021     2021         Clarita Hermosillo MD  2021  10:18 EDT

## 2021-06-14 LAB
DEPRECATED RDW RBC AUTO: 44.7 FL (ref 37–54)
ERYTHROCYTE [DISTWIDTH] IN BLOOD BY AUTOMATED COUNT: 14.8 % (ref 12.3–15.4)
HCT VFR BLD AUTO: 21.2 % (ref 34–46.6)
HGB BLD-MCNC: 7.1 G/DL (ref 12–15.9)
MCH RBC QN AUTO: 27.8 PG (ref 26.6–33)
MCHC RBC AUTO-ENTMCNC: 33.5 G/DL (ref 31.5–35.7)
MCV RBC AUTO: 83.1 FL (ref 79–97)
PLATELET # BLD AUTO: 187 10*3/MM3 (ref 140–450)
PMV BLD AUTO: 11.2 FL (ref 6–12)
RBC # BLD AUTO: 2.55 10*6/MM3 (ref 3.77–5.28)
WBC # BLD AUTO: 10.81 10*3/MM3 (ref 3.4–10.8)

## 2021-06-14 PROCEDURE — 85027 COMPLETE CBC AUTOMATED: CPT | Performed by: OBSTETRICS & GYNECOLOGY

## 2021-06-14 RX ORDER — SIMETHICONE 80 MG
80 TABLET,CHEWABLE ORAL 4 TIMES DAILY PRN
Status: DISCONTINUED | OUTPATIENT
Start: 2021-06-14 | End: 2021-06-15 | Stop reason: HOSPADM

## 2021-06-14 RX ADMIN — IBUPROFEN 800 MG: 800 TABLET, FILM COATED ORAL at 23:01

## 2021-06-14 RX ADMIN — DOCUSATE SODIUM 100 MG: 100 CAPSULE, LIQUID FILLED ORAL at 12:37

## 2021-06-14 RX ADMIN — OXYCODONE AND ACETAMINOPHEN 1 TABLET: 5; 325 TABLET ORAL at 23:01

## 2021-06-14 RX ADMIN — SIMETHICONE 80 MG: 80 TABLET, CHEWABLE ORAL at 12:55

## 2021-06-14 RX ADMIN — Medication 1 TABLET: at 08:33

## 2021-06-14 RX ADMIN — SIMETHICONE 80 MG: 80 TABLET, CHEWABLE ORAL at 18:55

## 2021-06-14 RX ADMIN — IBUPROFEN 800 MG: 800 TABLET, FILM COATED ORAL at 12:55

## 2021-06-14 RX ADMIN — IBUPROFEN 800 MG: 800 TABLET, FILM COATED ORAL at 02:19

## 2021-06-14 RX ADMIN — OXYCODONE AND ACETAMINOPHEN 1 TABLET: 5; 325 TABLET ORAL at 02:18

## 2021-06-14 RX ADMIN — OXYCODONE AND ACETAMINOPHEN 1 TABLET: 5; 325 TABLET ORAL at 17:07

## 2021-06-14 RX ADMIN — OXYCODONE AND ACETAMINOPHEN 1 TABLET: 5; 325 TABLET ORAL at 08:32

## 2021-06-14 RX ADMIN — OXYCODONE AND ACETAMINOPHEN 1 TABLET: 5; 325 TABLET ORAL at 12:55

## 2021-06-14 NOTE — PROGRESS NOTES
Section Progress Note    Assessment/Plan:  1. Status post  section. Doing well postoperatively.   - Continue current postoperative care.  - Passing small amount of flatus but encouraged ambulation.   - Anticipate discharge home tomorrow on POD#3     2. Anemia- secondary to acute blood loss anemia  - Repeat CBC stable from POD#1 Hgb 7.3 to POD#2 Hgb 7.1   - Will start on Fe supplementation at discharge    3. Elevated blood pressure  - Pressure (162/97)  taken when patient just got up and sat down with increased pain.  - Repeat /76.  - No evidence of preeclampsia at this time but will continue to monitor Bps.     Rh:O positive   Rubella: Immune  Infant: Male infant, s/p circumcision.       Subjective:  Postpartum Day 2:  Delivery    The patient feels well.Pain is moderately controlled with current medications. Urinary output is adequate. The patient is ambulating well. The patient is tolerating a normal diet. Flatus has been passed. She reports feeling tired.     Objective:  Vital signs in last 24 hours:  Temp:  [97.9 °F (36.6 °C)-98.9 °F (37.2 °C)] 98.9 °F (37.2 °C)  Heart Rate:  [] 99  Resp:  [16] 16  BP: ()/(62-97) 162/97        General:    alert, appears stated age and cooperative   Uterine Fundus:   firm   Incision:  healing well, no significant drainage, no dehiscence, no significant erythema   DVT Evaluation:  No cords or calf tenderness.  Calf/Ankle edema is present, trace.      Lab Results   Component Value Date    WBC 10.81 (H) 2021    HGB 7.1 (L) 2021    HCT 21.2 (L) 2021    MCV 83.1 2021     2021       Rebecca Rivera MD  2021  08:05 EDT

## 2021-06-14 NOTE — PLAN OF CARE
Goal Outcome Evaluation:      VSS, incision clean/dry/well approx, pain well controlled on PO medication, mom states that bonding well with infant.

## 2021-06-15 VITALS
HEART RATE: 82 BPM | BODY MASS INDEX: 44.41 KG/M2 | DIASTOLIC BLOOD PRESSURE: 66 MMHG | TEMPERATURE: 98.1 F | OXYGEN SATURATION: 97 % | HEIGHT: 68 IN | RESPIRATION RATE: 18 BRPM | WEIGHT: 293 LBS | SYSTOLIC BLOOD PRESSURE: 95 MMHG

## 2021-06-15 PROCEDURE — 0503F POSTPARTUM CARE VISIT: CPT | Performed by: OBSTETRICS & GYNECOLOGY

## 2021-06-15 RX ORDER — IBUPROFEN 800 MG/1
800 TABLET ORAL EVERY 8 HOURS PRN
Qty: 30 TABLET | Refills: 0 | Status: SHIPPED | OUTPATIENT
Start: 2021-06-15 | End: 2022-04-12

## 2021-06-15 RX ORDER — OXYCODONE HYDROCHLORIDE AND ACETAMINOPHEN 5; 325 MG/1; MG/1
1 TABLET ORAL EVERY 4 HOURS PRN
Qty: 20 TABLET | Refills: 0 | Status: SHIPPED | OUTPATIENT
Start: 2021-06-15 | End: 2021-06-19

## 2021-06-15 RX ORDER — FERROUS SULFATE 325(65) MG
325 TABLET ORAL
Qty: 90 TABLET | Refills: 0 | Status: SHIPPED | OUTPATIENT
Start: 2021-06-15

## 2021-06-15 RX ADMIN — OXYCODONE AND ACETAMINOPHEN 1 TABLET: 5; 325 TABLET ORAL at 08:21

## 2021-06-15 RX ADMIN — IBUPROFEN 800 MG: 800 TABLET, FILM COATED ORAL at 08:22

## 2021-06-15 RX ADMIN — Medication 1 TABLET: at 08:21

## 2021-06-15 NOTE — PROGRESS NOTES
Section Progress Note    Assessment/Plan     Status post  section: Doing well postoperatively.     Postop care--meeting postoperative milestones.  Discharge home today.  Discharge instructions reviewed with patient.    Anemia--secondary to acute blood loss at surgery.  She is asymptomatic.  Will discharge home on iron.    Rh status: O pos  Rubella: Immune  Gender: male    Subjective     Postpartum Day 3:  Delivery    The patient feels well. The patient denies emotional concerns. Pain is well controlled with current medications. The baby iswell.Urinary output is adequate. The patient is ambulating well. The patient is tolerating a normal diet. Patient reports flatus.    Objective     Vital signs in last 24 hours:  Temp:  [98 °F (36.7 °C)-98.3 °F (36.8 °C)] 98.1 °F (36.7 °C)  Heart Rate:  [80-92] 82  Resp:  [16-18] 18  BP: ()/(66-82) 95/66      General:    alert, appears stated age and cooperative   Bowel Sounds:  active   Lochia:  appropriate   Uterine Fundus:   firm   Incision:  healing well, no significant drainage, no dehiscence, no significant erythema   DVT Evaluation:  No evidence of DVT seen on physical exam.     Lab Results   Component Value Date    WBC 10.81 (H) 2021    HGB 7.1 (L) 2021    HCT 21.2 (L) 2021    MCV 83.1 2021     2021         Clarita Hermosillo MD  6/15/2021  10:13 EDT

## 2021-06-15 NOTE — DISCHARGE SUMMARY
Date of Discharge:  6/15/2021    Discharge Diagnosis: S/P  section for arrest of dilation and nonreassuring fetal heart tones    Presenting Problem/History of Present Illness  Active Hospital Problems    Diagnosis  POA   • **S/P  section [Z98.891]  Not Applicable   • Pregnancy [Z34.90]  Not Applicable   • Maternal obesity affecting pregnancy, antepartum [O99.210]  Unknown   • Excessive fetal growth affecting management of pregnancy in third trimester, single or unspecified fetus [O36.63X0]  Yes      Resolved Hospital Problems   No resolved problems to display.        Hospital Course  Patient is a 26 y.o. female presented at 40 weeks and 6 days for induction for postdates pregnancy and maternal obesity.  Patient was 1 cm on admission and GBS negative.  She was started on Pitocin and had assisted rupture of membranes for clear fluid.  She received an epidural for pain control.  Patient remained 4 cm for approximately 12 hours and had intermittent episodes of late decelerations.  Due to arrest of dilation with adequate contractions and nonreassuring fetal heart tones,  section was recommended.  She underwent a primary low transverse  section and delivered a live-born male weighing 4030 g with Apgars of 8 at 1 minute and 9 at 5 minutes.  Please see operative note for further details on her surgery.  Her postoperative course was complicated by anemia, but she was asymptomatic and had no concerns for ongoing bleeding after delivery.  She was discharged home on postoperative day #3 on iron.  Discharge instructions were reviewed.    Procedures Performed    Procedure(s):   SECTION PRIMARY  -------------------       Consults:   Consults     Date and Time Order Name Status Description    2021 12:40 AM Inpatient Anesthesiology Consult              Condition on Discharge:  stable    Vital Signs  Temp:  [98 °F (36.7 °C)-98.3 °F (36.8 °C)] 98.1 °F (36.7 °C)  Heart Rate:  [80-92]  82  Resp:  [16-18] 18  BP: ()/(66-82) 95/66    Physical Exam:   see progress note    Discharge Disposition  Home or Self Care    Discharge Medications     Discharge Medications      New Medications      Instructions Start Date   ibuprofen 800 MG tablet  Commonly known as: ADVIL,MOTRIN   800 mg, Oral, Every 8 Hours PRN      oxyCODONE-acetaminophen 5-325 MG per tablet  Commonly known as: PERCOCET   1 tablet, Oral, Every 4 Hours PRN         Continue These Medications      Instructions Start Date   ferrous sulfate 325 (65 FE) MG tablet   325 mg, Oral, Daily With Breakfast      prenatal (CLASSIC) vitamin  tablet  Generic drug: prenatal vitamin   1 tablet, Oral, Daily         Stop These Medications    ondansetron 4 MG tablet  Commonly known as: Zofran            Discharge Diet:     Activity at Discharge:   Activity Instructions     Pelvic Rest            Follow-up Appointments  No future appointments.  Additional Instructions for the Follow-ups that You Need to Schedule     Call MD With Problems / Concerns   As directed      Instructions: Call with heavy bleeding, uncontrolled pain, or fevers    Order Comments: Instructions: Call with heavy bleeding, uncontrolled pain, or fevers          Discharge Follow-up with Specified Provider: Dr. Hermosillo; 2 Weeks   As directed      To: Dr. Hermosillo    Follow Up: 2 Weeks               Test Results Pending at Discharge       Clarita Hermosillo MD  06/15/21  10:17 EDT    Time: Discharge >30 min

## 2021-06-25 ENCOUNTER — OFFICE VISIT (OUTPATIENT)
Dept: OBSTETRICS AND GYNECOLOGY | Facility: CLINIC | Age: 26
End: 2021-06-25

## 2021-06-25 VITALS
WEIGHT: 286 LBS | SYSTOLIC BLOOD PRESSURE: 117 MMHG | BODY MASS INDEX: 43.49 KG/M2 | DIASTOLIC BLOOD PRESSURE: 85 MMHG | HEART RATE: 71 BPM

## 2021-06-25 DIAGNOSIS — Z09 POSTOP CHECK: Primary | ICD-10-CM

## 2021-06-25 PROBLEM — O36.63X0 EXCESSIVE FETAL GROWTH AFFECTING MANAGEMENT OF PREGNANCY IN THIRD TRIMESTER, SINGLE OR UNSPECIFIED FETUS: Status: RESOLVED | Noted: 2021-06-08 | Resolved: 2021-06-25

## 2021-06-25 PROBLEM — Z34.90 PREGNANCY: Status: RESOLVED | Noted: 2021-06-11 | Resolved: 2021-06-25

## 2021-06-25 PROBLEM — O99.210 MATERNAL OBESITY AFFECTING PREGNANCY, ANTEPARTUM: Status: RESOLVED | Noted: 2021-06-11 | Resolved: 2021-06-25

## 2021-06-25 PROCEDURE — 0503F POSTPARTUM CARE VISIT: CPT | Performed by: OBSTETRICS & GYNECOLOGY

## 2021-06-25 NOTE — PROGRESS NOTES
Chief Complaint  Post-op- 2 wk incision check     Subjective          Randi Martell presents to [facility]  History of Present Illness  Patient is 2-week status post primary  at 41 weeks for arrest of dilation and nonreassuring fetal heart tones.  She has no complaints today.  She states she is only using Tylenol and ibuprofen for pain.  She denies any problems with voiding or with bowel movements.  She is bottlefeeding.  She denies that her vaginal bleeding is heavy.    Objective   Vital Signs:   /85   Pulse 71   Wt 130 kg (286 lb)   BMI 43.49 kg/m²     Physical Exam  Vitals reviewed.   Constitutional:       Appearance: She is well-developed.   Cardiovascular:      Rate and Rhythm: Normal rate and regular rhythm.   Pulmonary:      Effort: Pulmonary effort is normal.      Breath sounds: Normal breath sounds.   Abdominal:      General: There is no distension.      Palpations: Abdomen is soft.      Tenderness: There is no abdominal tenderness. There is no guarding or rebound.      Comments: Incision intact with no redness, drainage, or tenderness     Neurological:      Mental Status: She is alert.   Psychiatric:         Mood and Affect: Mood normal.         Behavior: Behavior normal.                 Assessment and Plan    Diagnoses and all orders for this visit:    1. Postop check (Primary)    Her incision is healing well.  Discussed continued postoperative wound care.  She will follow-up in 4 weeks for postpartum physical.  I spent 20 minutes caring for Randi on this date of service. This time includes time spent by me in the following activities:obtaining and/or reviewing a separately obtained history, performing a medically appropriate examination and/or evaluation , counseling and educating the patient/family/caregiver and documenting information in the medical record  Follow Up   Return in about 4 weeks (around 2021).  Patient was given instructions and  counseling regarding her condition or for health maintenance advice. Please see specific information pulled into the AVS if appropriate.

## 2021-07-20 ENCOUNTER — POSTPARTUM VISIT (OUTPATIENT)
Dept: OBSTETRICS AND GYNECOLOGY | Facility: CLINIC | Age: 26
End: 2021-07-20

## 2021-07-20 VITALS
HEART RATE: 65 BPM | WEIGHT: 281 LBS | BODY MASS INDEX: 42.73 KG/M2 | SYSTOLIC BLOOD PRESSURE: 110 MMHG | DIASTOLIC BLOOD PRESSURE: 79 MMHG

## 2021-07-20 PROCEDURE — 0503F POSTPARTUM CARE VISIT: CPT | Performed by: OBSTETRICS & GYNECOLOGY

## 2021-07-20 NOTE — PROGRESS NOTES
Chief Complaint  Postpartum Care- 6 wk pp exam    Subjective          Randi Martell presents to [facility]  History of Present Illness  Patient is a 26-year-old G1, P1 who is 6 weeks status post primary  at 41 weeks for arrest of dilation.  She has no complaints today.  She does report light bleeding today that she thinks may be her menstrual period.  She denies any pain.  She is bottlefeeding.  She denies any concerns for postpartum depression and/or anxiety.    Objective   Vital Signs:   /79   Pulse 65   Wt 127 kg (281 lb)   BMI 42.73 kg/m²     Physical Exam  Vitals reviewed. Exam conducted with a chaperone present.   Constitutional:       Appearance: She is well-developed.   Cardiovascular:      Rate and Rhythm: Normal rate and regular rhythm.   Pulmonary:      Effort: Pulmonary effort is normal.      Breath sounds: Normal breath sounds.   Chest:      Breasts:         Right: No inverted nipple, mass, nipple discharge, skin change or tenderness.         Left: No inverted nipple, mass, nipple discharge, skin change or tenderness.   Abdominal:      General: There is no distension.      Palpations: Abdomen is soft.      Tenderness: There is no abdominal tenderness.   Genitourinary:     Labia:         Right: No rash, tenderness, lesion or injury.         Left: No rash, tenderness, lesion or injury.       Vagina: Normal.      Cervix: Normal.      Uterus: Normal.       Adnexa:         Right: No mass, tenderness or fullness.          Left: No mass, tenderness or fullness.     Neurological:      Mental Status: She is alert.                 Assessment and Plan {CC Problem List  Visit Diagnosis   ROS  Review (Popup)  [facility] Maintenance  Quality  BestPractice  Medications  SmartSets  SnapShot Encounters  Media :23}   Diagnoses and all orders for this visit:    1. Routine postpartum follow-up (Primary)    She is doing well postpartum.  She may resume all normal  activities with no restrictions.  She is wanting to use Nexplanon for contraception and a prior authorization will be completed.  She will return for Nexplanon insertion.      Follow Up   No follow-ups on file.  Patient was given instructions and counseling regarding her condition or for health maintenance advice. Please see specific information pulled into the AVS if appropriate.

## 2021-08-13 ENCOUNTER — OFFICE VISIT (OUTPATIENT)
Dept: OBSTETRICS AND GYNECOLOGY | Facility: CLINIC | Age: 26
End: 2021-08-13

## 2021-08-13 VITALS — BODY MASS INDEX: 42.76 KG/M2 | WEIGHT: 281.2 LBS

## 2021-08-13 DIAGNOSIS — Z30.017 NEXPLANON INSERTION: Primary | ICD-10-CM

## 2021-08-13 LAB
B-HCG UR QL: NEGATIVE
INTERNAL NEGATIVE CONTROL: NEGATIVE
INTERNAL POSITIVE CONTROL: POSITIVE
Lab: NORMAL

## 2021-08-13 PROCEDURE — 11981 INSERTION DRUG DLVR IMPLANT: CPT | Performed by: OBSTETRICS & GYNECOLOGY

## 2021-08-13 PROCEDURE — 81025 URINE PREGNANCY TEST: CPT | Performed by: OBSTETRICS & GYNECOLOGY

## 2021-08-13 NOTE — PROGRESS NOTES
CC: Nexplanon insertion    Lot#:[id number]  Exp:11/28/22  NDC:[id number]    Procedure: Nexplanon insertion  Preoperative diagnosis: Desires long acting reversible contraception  Postoperative diagnosis: Same  Indications:  Patient counseled on all forms of birth control and discussed their risks, side effects, and bleeding profiles.  Patient has elected to try Nexplanon.  Anesthesia: 1% lidocaine with epinephrine  Pathology: None  Estimated blood loss: Less than 5 mL  Complications: None    Procedure in detail:    Risks, benefits, alternatives explained. All questions answered. Consents signed.  Patient placed on examined table. Nexplanon to be inserted into nondominant left arm. The area for insertion prepped with betadine in a sterile fashion. About 5 mL of 1% lidocaine with epinephrine was injected.   The insertion site was identified approximately 8-10 cm proximal to the elbow, 3-5 cm posterior to the biceps and triceps sulcus, overlying the triceps muscle. The skin at the insertion site was stretched by my thumb and index finger. Then inserted the needle tip, bevel side up, at an angle not greater than 20° to the skin surface, just until the skin was penetrated. The applicator was then lowered so that it was parallel to the arm. To minimize the chance of deep incision, the skin was tented upwards with the tip of the needle. The needle was then gently inserted to the full length. Then fixed the device in place on the arm with one hand. I then slowly and carefully retracted the needle cannula back along the length of the device after pushing the release button. The obturator was seen in the device to determine that the nexplanon had been released. Both the patient and I were easily able to feel the device under the skin. Steri-Strips were applied at the site, and the arm was gently wrapped with gauze. There were no complications. The patient tolerated the procedure well. She was given a reminder card. She was  advised to use condoms as a backup method for at least a week after insertion.

## 2022-01-28 PROBLEM — K59.00 CONSTIPATION: Status: ACTIVE | Noted: 2022-01-28

## 2022-01-28 PROBLEM — F41.8 DEPRESSION WITH ANXIETY: Status: ACTIVE | Noted: 2022-01-28

## 2022-01-28 PROBLEM — D64.9 ANEMIA: Status: ACTIVE | Noted: 2022-01-28

## 2022-01-28 PROBLEM — R12 HEARTBURN: Status: ACTIVE | Noted: 2022-01-28

## 2022-01-28 PROBLEM — M54.9 CHRONIC BACK PAIN: Status: ACTIVE | Noted: 2022-01-28

## 2022-01-28 PROBLEM — N92.0 HEAVY PERIODS: Status: ACTIVE | Noted: 2022-01-28

## 2022-01-28 PROBLEM — G89.29 CHRONIC BACK PAIN: Status: ACTIVE | Noted: 2022-01-28

## 2022-01-28 PROBLEM — E66.813 CLASS 3 SEVERE OBESITY WITH BODY MASS INDEX (BMI) OF 40.0 TO 44.9 IN ADULT: Status: ACTIVE | Noted: 2022-01-28

## 2022-01-28 PROBLEM — I73.9 INTERMITTENT CLAUDICATION (HCC): Status: ACTIVE | Noted: 2022-01-28

## 2022-01-28 PROBLEM — M25.569 KNEE PAIN: Status: ACTIVE | Noted: 2022-01-28

## 2022-01-28 PROBLEM — E66.01 CLASS 3 SEVERE OBESITY WITH BODY MASS INDEX (BMI) OF 40.0 TO 44.9 IN ADULT (HCC): Status: ACTIVE | Noted: 2022-01-28

## 2022-02-15 ENCOUNTER — CONSULT (OUTPATIENT)
Dept: BARIATRICS/WEIGHT MGMT | Facility: CLINIC | Age: 27
End: 2022-02-15

## 2022-02-15 VITALS
BODY MASS INDEX: 43.8 KG/M2 | HEIGHT: 68 IN | SYSTOLIC BLOOD PRESSURE: 120 MMHG | RESPIRATION RATE: 18 BRPM | HEART RATE: 96 BPM | TEMPERATURE: 97.8 F | DIASTOLIC BLOOD PRESSURE: 77 MMHG | WEIGHT: 289 LBS

## 2022-02-15 DIAGNOSIS — M25.561 ARTHRALGIA OF BOTH KNEES: ICD-10-CM

## 2022-02-15 DIAGNOSIS — D50.9 IRON DEFICIENCY ANEMIA, UNSPECIFIED IRON DEFICIENCY ANEMIA TYPE: ICD-10-CM

## 2022-02-15 DIAGNOSIS — M25.562 ARTHRALGIA OF BOTH KNEES: ICD-10-CM

## 2022-02-15 DIAGNOSIS — Z01.818 PRE-OP EVALUATION: ICD-10-CM

## 2022-02-15 DIAGNOSIS — R12 HEARTBURN: ICD-10-CM

## 2022-02-15 DIAGNOSIS — E66.01 OBESITY, CLASS III, BMI 40-49.9 (MORBID OBESITY): Primary | ICD-10-CM

## 2022-02-15 PROBLEM — E66.813 OBESITY, CLASS III, BMI 40-49.9 (MORBID OBESITY): Status: ACTIVE | Noted: 2022-02-15

## 2022-02-15 PROBLEM — Z71.3 DIETARY COUNSELING: Status: ACTIVE | Noted: 2022-02-15

## 2022-02-15 PROBLEM — Z98.891 S/P CESAREAN SECTION: Status: RESOLVED | Noted: 2021-06-12 | Resolved: 2022-02-15

## 2022-02-15 PROBLEM — F41.9 ANXIETY: Status: ACTIVE | Noted: 2022-01-28

## 2022-02-15 PROBLEM — E66.813 CLASS 3 SEVERE OBESITY WITH BODY MASS INDEX (BMI) OF 40.0 TO 44.9 IN ADULT: Status: RESOLVED | Noted: 2022-01-28 | Resolved: 2022-02-15

## 2022-02-15 PROCEDURE — 99214 OFFICE O/P EST MOD 30 MIN: CPT | Performed by: NURSE PRACTITIONER

## 2022-02-15 NOTE — PROGRESS NOTES
[facility]  [address] [facility] [address]  [facility] [id number] [phone]  [address] [id number] [phone]  Dept: [phone]  2/15/2022      Randi Martell.  [id number]  [id number]  1995  female      Chief Complaint of weight gain; unable to maintain weight loss    History of Present Illness:   Randi is a 26 y.o. female who presents today for evaluation, education and consultation regarding weight loss surgery. The patient is interested in the sleeve gastrectomy.      Diet History:Randi has been overweight for at least 18 years, has been 35 pounds or more overweight for at least 15 years, has been 100 pounds or more overweight for 10+ or more years and started dieting at age 19.  The most weight Randi lost was 15 pounds on IF and maintained the weight loss for 2 months. Randi describes her eating habits as volujme eater and snacker. Randi Martell has tried Fasting, reduced calorie and exercising among others with success of losing up to 15 pounds, but in each instance regained the weight.     See dietician documentation for complete history.    Bariatric Surgery Evaluation: The patient is being seen for an initial visit for bariatric surgery evaluation.     Bariatric Co-morbidities:  knee pain and GERD    Patient Active Problem List   Diagnosis   • Intermittent claudication (HCC)   • Heartburn   • Constipation   • Heavy periods   • Knee joint pain   • Anxiety   • Chronic back pain   • Anemia   • Obesity, Class III, BMI 40-49.9 (morbid obesity) (HCC)   • Dietary counseling   • Pre-op evaluation       Past Medical History:   Diagnosis Date   • Anemia    • Jaw pain    • S/P  section 2021       Past Surgical History:   Procedure Laterality Date   •  SECTION N/A 2021    Procedure:  SECTION PRIMARY;  Surgeon: Clarita Hermosillo MD;  Location: [facility] LABOR DELIVERY;   Service: Obstetrics/Gynecology;  Laterality: N/A;       Allergies   Allergen Reactions   • Pollen Extract Other (See Comments)         Current Outpatient Medications:   •  Etonogestrel (NEXPLANON) 68 MG implant subdermal implant, Inject 1 each into the appropriate area of the skin as directed by provider 1 (One) Time., Disp: , Rfl:   •  ferrous sulfate 325 (65 FE) MG tablet, Take 1 tablet by mouth Daily With Breakfast., Disp: 90 tablet, Rfl: 0  •  ibuprofen (ADVIL,MOTRIN) 800 MG tablet, Take 1 tablet by mouth Every 8 (Eight) Hours As Needed for Mild Pain ., Disp: 30 tablet, Rfl: 0    Social History     Socioeconomic History   • Marital status: Single   • Number of children: 1   Tobacco Use   • Smoking status: Never Smoker   • Smokeless tobacco: Never Used   Vaping Use   • Vaping Use: Never used   Substance and Sexual Activity   • Alcohol use: Never   • Drug use: Never   • Sexual activity: Defer       Family History   Problem Relation Age of Onset   • Depression Mother    • Obesity Mother    • Hypertension Mother    • Sleep apnea Mother    • Diabetes Father    • Heart disease Father    • Depression Father    • Obesity Father    • Hypertension Father    • Sleep apnea Father    • No Known Problems Sister    • Rheum arthritis Brother    • Stroke Maternal Grandmother         also had head trauma   • Obesity Maternal Grandmother    • Obesity Maternal Grandfather    • Heart attack Paternal Grandmother    • Diabetes Paternal Grandmother    • Melanoma Paternal Grandfather    • Diabetes Paternal Grandfather    • No Known Problems Brother          Review of Systems:  Review of Systems   All other systems reviewed and are negative.      Physical Exam:  Vital Signs:  Weight: 131 kg (289 lb)   Body mass index is 44.52 kg/m².  Temp: 97.8 °F (36.6 °C)   Heart Rate: 96   BP: 120/77     Physical Exam  Vitals reviewed.   Constitutional:       Appearance: Normal appearance. She is well-developed. She is obese.   HENT:      Head:  Normocephalic and atraumatic.   Cardiovascular:      Rate and Rhythm: Normal rate.   Pulmonary:      Effort: Pulmonary effort is normal.      Breath sounds: Normal breath sounds.   Abdominal:      General: Bowel sounds are normal. There is no distension.      Palpations: Abdomen is soft.      Tenderness: There is no abdominal tenderness.   Musculoskeletal:         General: Normal range of motion.   Skin:     General: Skin is warm and dry.   Neurological:      Mental Status: She is alert and oriented to person, place, and time.   Psychiatric:         Behavior: Behavior normal.         Thought Content: Thought content normal.         Judgment: Judgment normal.            Assessment:         Randi Martell is a 26 y.o. year old female with medically complicated severe obesity. Weight: 131 kg (289 lb), Body mass index is 44.52 kg/m². and weight related problems including knee pain and GERD.    I explained in detail, potential surgical options of interest to the patient including the RNY gastric bypass, sleeve gastrectomy, and gastric band while considering the patient's medical history. At this time, the patient expressed interest in the sleeve gastrectomy.  All of those procedures can be performed laparoscopically but there is a chance to convert to open if any technical challenges or complications do occur.  Bariatric surgery is not cosmetic surgery but rather a tool to help a patient make a life-long commitment lifestyle changes including diet, exercise, behavior changes, and taking supplemental vitamins and minerals. The risks, benefits, alternatives, and potential complications of all of the procedures were explained in detail including but not limited to failure to lose weight or gain weight and change in body image, metabolic complications. The patient was informed that surgery is a tool and requires lifestyle change including dietary modification to be successful. The patient was made aware of the need for  vitamin supplementation after surgery due to the risk of deficiencies including but not limited to calcium, thiamine, vitamin B12, folate, iron, and anemia.    The patient was advised to start a high protein, low fat and low carbohydrate diet. The patient was given individualized information by our dietician along with handouts. The patient was encouraged to start routine exercise including but not limited to 150 minutes per week. The patient received a resistance band along with a handout of exercises.     The patient was given information regarding the VALERIE educational video. VALERIE is an internet based educational video which explains the surgical procedure and answers basic questions regarding the procedure. The patient was provided with instructions and a password to watch the video.    Due to the patient's BMI, history and co-morbidities they are at a high risk for surgery and will obtain the following:  -The patient has been advised that a letter of medical support and a history and physical must be obtained from her primary care physician. The patient was given a copy of a sample form, that will suffice as their letter to take to their primary are provider.  -A referral for pre-operative psychological evaluation was ordered for the patient to evaluate candidacy as well as provide mental health support, should it be warranted before or after surgery.  -Pre-operative testing will be ordered to include: CBC, CMP,TSH and HgbA1C will be drawn, CXR, EKG. Previous results of testing were reviewed including cbc.     -Randi Martell will be set up for a pre-operative diagnostic esophagogastroduodenoscopy with biopsy for evaluation. The risks and benefits of the procedure were discussed with the patient in detail and all questions were answered.  Possibility of perforation, bleeding, aspiration, anoxic brain injury, respiratory and/or cardiac arrest and death were discussed. The patient received handouts regarding  her instructions and all questions were answered.      Randi Martell was screened for sleep apnea in our office today and based on their results she is low risk for ALFREDO. The risks, as they relate to chronic hypercapnia r/t untreated ALFREDO were discussed with the patient and they verbalized understanding.     The patient understands the surgical procedures and the different surgical options that are available.  She understands the lifestyle changes that would be required after surgery and has agreed to participate in a pre-operative and postoperative weight management program.  She also expressed understanding of possible risks, had several questions answered and desires to proceed.    I think she is a good candidate for this surgery, and is interested in a sleeve gastrectomy.    Encounter Diagnoses   Name Primary?   • Obesity, Class III, BMI 40-49.9 (morbid obesity) (HCC) Yes   • Heartburn    • Arthralgia of both knees    • Iron deficiency anemia, unspecified iron deficiency anemia type    • Pre-op evaluation        Plan:    The consultation plan was reviewed with the patient.  Patient will have evaluations and follow up with bariatric dieticians and a psychologist before undergoing a multidisciplinary review of her candidacy.  We also discussed the weight loss requirement and rationale, and other program requirements.      Anayeli Garibay, APRN  2/15/2022

## 2022-04-12 ENCOUNTER — OFFICE VISIT (OUTPATIENT)
Dept: FAMILY MEDICINE CLINIC | Facility: CLINIC | Age: 27
End: 2022-04-12

## 2022-04-12 ENCOUNTER — LAB (OUTPATIENT)
Dept: LAB | Facility: HOSPITAL | Age: 27
End: 2022-04-12

## 2022-04-12 ENCOUNTER — HOSPITAL ENCOUNTER (OUTPATIENT)
Dept: GENERAL RADIOLOGY | Facility: HOSPITAL | Age: 27
Discharge: HOME | End: 2022-04-12

## 2022-04-12 ENCOUNTER — HOSPITAL ENCOUNTER (OUTPATIENT)
Dept: CARDIOLOGY | Facility: HOSPITAL | Age: 27
Discharge: HOME | End: 2022-04-12

## 2022-04-12 VITALS
DIASTOLIC BLOOD PRESSURE: 76 MMHG | OXYGEN SATURATION: 100 % | SYSTOLIC BLOOD PRESSURE: 110 MMHG | HEIGHT: 68 IN | BODY MASS INDEX: 44.41 KG/M2 | WEIGHT: 293 LBS | HEART RATE: 78 BPM

## 2022-04-12 DIAGNOSIS — R12 HEARTBURN: ICD-10-CM

## 2022-04-12 DIAGNOSIS — M25.561 ARTHRALGIA OF BOTH KNEES: ICD-10-CM

## 2022-04-12 DIAGNOSIS — M25.562 ARTHRALGIA OF BOTH KNEES: ICD-10-CM

## 2022-04-12 DIAGNOSIS — D50.9 IRON DEFICIENCY ANEMIA, UNSPECIFIED IRON DEFICIENCY ANEMIA TYPE: ICD-10-CM

## 2022-04-12 DIAGNOSIS — Z01.818 PRE-OP EVALUATION: ICD-10-CM

## 2022-04-12 DIAGNOSIS — E66.01 OBESITY, CLASS III, BMI 40-49.9 (MORBID OBESITY): ICD-10-CM

## 2022-04-12 DIAGNOSIS — E66.01 OBESITY, CLASS III, BMI 40-49.9 (MORBID OBESITY): Primary | ICD-10-CM

## 2022-04-12 LAB
ALBUMIN SERPL-MCNC: 4.2 G/DL (ref 3.5–5.2)
ALBUMIN/GLOB SERPL: 1.4 G/DL
ALP SERPL-CCNC: 80 U/L (ref 39–117)
ALT SERPL W P-5'-P-CCNC: 12 U/L (ref 1–33)
ANION GAP SERPL CALCULATED.3IONS-SCNC: 9.3 MMOL/L (ref 5–15)
AST SERPL-CCNC: 15 U/L (ref 1–32)
BASOPHILS # BLD AUTO: 0.06 10*3/MM3 (ref 0–0.2)
BASOPHILS NFR BLD AUTO: 0.6 % (ref 0–1.5)
BILIRUB SERPL-MCNC: 0.4 MG/DL (ref 0–1.2)
BUN SERPL-MCNC: 11 MG/DL (ref 6–20)
BUN/CREAT SERPL: 15.9 (ref 7–25)
CALCIUM SPEC-SCNC: 9 MG/DL (ref 8.6–10.5)
CHLORIDE SERPL-SCNC: 104 MMOL/L (ref 98–107)
CO2 SERPL-SCNC: 25.7 MMOL/L (ref 22–29)
CREAT SERPL-MCNC: 0.69 MG/DL (ref 0.57–1)
DEPRECATED RDW RBC AUTO: 47.3 FL (ref 37–54)
EGFRCR SERPLBLD CKD-EPI 2021: 122.2 ML/MIN/1.73
EOSINOPHIL # BLD AUTO: 0.14 10*3/MM3 (ref 0–0.4)
EOSINOPHIL NFR BLD AUTO: 1.5 % (ref 0.3–6.2)
ERYTHROCYTE [DISTWIDTH] IN BLOOD BY AUTOMATED COUNT: 17.3 % (ref 12.3–15.4)
GLOBULIN UR ELPH-MCNC: 3 GM/DL
GLUCOSE SERPL-MCNC: 72 MG/DL (ref 65–99)
HCT VFR BLD AUTO: 36 % (ref 34–46.6)
HGB BLD-MCNC: 10.9 G/DL (ref 12–15.9)
IMM GRANULOCYTES # BLD AUTO: 0.03 10*3/MM3 (ref 0–0.05)
IMM GRANULOCYTES NFR BLD AUTO: 0.3 % (ref 0–0.5)
LYMPHOCYTES # BLD AUTO: 2.57 10*3/MM3 (ref 0.7–3.1)
LYMPHOCYTES NFR BLD AUTO: 27.2 % (ref 19.6–45.3)
MCH RBC QN AUTO: 23.1 PG (ref 26.6–33)
MCHC RBC AUTO-ENTMCNC: 30.3 G/DL (ref 31.5–35.7)
MCV RBC AUTO: 76.3 FL (ref 79–97)
MONOCYTES # BLD AUTO: 0.78 10*3/MM3 (ref 0.1–0.9)
MONOCYTES NFR BLD AUTO: 8.3 % (ref 5–12)
NEUTROPHILS NFR BLD AUTO: 5.87 10*3/MM3 (ref 1.7–7)
NEUTROPHILS NFR BLD AUTO: 62.1 % (ref 42.7–76)
NRBC BLD AUTO-RTO: 0 /100 WBC (ref 0–0.2)
PLATELET # BLD AUTO: 435 10*3/MM3 (ref 140–450)
PMV BLD AUTO: 11.4 FL (ref 6–12)
POTASSIUM SERPL-SCNC: 4.1 MMOL/L (ref 3.5–5.2)
PROT SERPL-MCNC: 7.2 G/DL (ref 6–8.5)
RBC # BLD AUTO: 4.72 10*6/MM3 (ref 3.77–5.28)
SODIUM SERPL-SCNC: 139 MMOL/L (ref 136–145)
TSH SERPL DL<=0.05 MIU/L-ACNC: 0.74 UIU/ML (ref 0.27–4.2)
WBC NRBC COR # BLD: 9.45 10*3/MM3 (ref 3.4–10.8)

## 2022-04-12 PROCEDURE — 36415 COLL VENOUS BLD VENIPUNCTURE: CPT | Performed by: NURSE PRACTITIONER

## 2022-04-12 PROCEDURE — 99213 OFFICE O/P EST LOW 20 MIN: CPT | Performed by: NURSE PRACTITIONER

## 2022-04-12 PROCEDURE — 85025 COMPLETE CBC W/AUTO DIFF WBC: CPT | Performed by: NURSE PRACTITIONER

## 2022-04-12 PROCEDURE — 84443 ASSAY THYROID STIM HORMONE: CPT | Performed by: NURSE PRACTITIONER

## 2022-04-12 PROCEDURE — 71046 X-RAY EXAM CHEST 2 VIEWS: CPT

## 2022-04-12 PROCEDURE — 93005 ELECTROCARDIOGRAM TRACING: CPT | Performed by: NURSE PRACTITIONER

## 2022-04-12 PROCEDURE — 83036 HEMOGLOBIN GLYCOSYLATED A1C: CPT | Performed by: NURSE PRACTITIONER

## 2022-04-12 PROCEDURE — 80053 COMPREHEN METABOLIC PANEL: CPT | Performed by: NURSE PRACTITIONER

## 2022-04-12 RX ORDER — RIBOFLAVIN (VITAMIN B2) 100 MG
1 TABLET ORAL 2 TIMES DAILY
COMMUNITY

## 2022-04-12 NOTE — PROGRESS NOTES
"Chief Complaint  Obesity (Bariatric form)    Subjective          Randi Martell presents to [facility] PRIMARY [facility]  Presents to get clearance for bariatric surgery. She is going to have a gastric sleeve per Dr. Kiran Staples. The surgery has not been scheduled she is still going through the preoperative testing.     Obesity  This is a chronic problem. The current episode started more than 1 year ago. Associated symptoms include arthralgias and joint swelling (knees). Pertinent negatives include no abdominal pain, chest pain, chills, fatigue, fever, nausea or vomiting. Associated symptoms comments: SOA with exertion due to her weight. Nothing aggravates the symptoms. Treatments tried: intermittent fasting, keto diet, portion control. The treatment provided no relief.       I have reviewed the patient's medical history in detail and updated the computerized patient record.    Current Outpatient Medications:   •  ascorbic acid (VITAMIN C) 100 MG tablet, Take 1 tablet by mouth 2 (Two) Times a Day., Disp: , Rfl:   •  Etonogestrel (NEXPLANON) 68 MG implant subdermal implant, Inject 1 each into the appropriate area of the skin as directed by provider 1 (One) Time., Disp: , Rfl:   •  ferrous sulfate 325 (65 FE) MG tablet, Take 1 tablet by mouth Daily With Breakfast. (Patient taking differently: Take 325 mg by mouth 2 (Two) Times a Day With Meals.), Disp: 90 tablet, Rfl: 0     Objective   Vital Signs:   /76 (BP Location: Left arm, Patient Position: Sitting, Cuff Size: Adult)   Pulse 78   Ht 171.5 cm (67.5\")   Wt 133 kg (293 lb)   SpO2 100%   BMI 45.21 kg/m²            Physical Exam  Vitals reviewed.   Constitutional:       Appearance: Normal appearance. She is obese.   Cardiovascular:      Rate and Rhythm: Normal rate and regular rhythm.      Pulses: Normal pulses.      Heart sounds: Normal heart sounds.   Pulmonary:      Effort: Pulmonary effort is normal.      Breath sounds: Normal breath " sounds.   Abdominal:      General: Bowel sounds are normal.      Palpations: Abdomen is soft.   Musculoskeletal:         General: Normal range of motion.      Right lower leg: No edema.      Left lower leg: No edema.   Skin:     General: Skin is warm and dry.   Neurological:      Mental Status: She is alert and oriented to person, place, and time.   Psychiatric:         Mood and Affect: Mood normal.         Behavior: Behavior normal.         Thought Content: Thought content normal.         Judgment: Judgment normal.        Result Review :                 Assessment and Plan    Diagnoses and all orders for this visit:    1. Obesity, Class III, BMI 40-49.9 (morbid obesity) (HCC) (Primary)    Ms. Martell appears to be doing well today.  Her physical exam is unremarkable.  It is my medical opinion that she is a candidate for gastric sleep placement.      Follow Up   Return in about 5 months (around 9/12/2022) for Annual physical, Fasting labs 1 week prior to next f/u.  Patient was given instructions and counseling regarding her condition or for health maintenance advice. Please see specific information pulled into the AVS if appropriate.

## 2022-04-13 LAB
HBA1C MFR BLD: 4.8 % (ref 4.8–5.6)
QT INTERVAL: 369 MS

## 2022-04-16 ENCOUNTER — LAB (OUTPATIENT)
Dept: LAB | Facility: HOSPITAL | Age: 27
End: 2022-04-16

## 2022-04-16 DIAGNOSIS — Z01.818 PRE-OP EVALUATION: ICD-10-CM

## 2022-04-16 DIAGNOSIS — E66.01 OBESITY, CLASS III, BMI 40-49.9 (MORBID OBESITY): ICD-10-CM

## 2022-04-16 LAB — SARS-COV-2 RNA PNL SPEC NAA+PROBE: NOT DETECTED

## 2022-04-16 PROCEDURE — U0004 COV-19 TEST NON-CDC HGH THRU: HCPCS

## 2022-04-16 PROCEDURE — C9803 HOPD COVID-19 SPEC COLLECT: HCPCS

## 2022-04-19 ENCOUNTER — ANESTHESIA EVENT (OUTPATIENT)
Dept: GASTROENTEROLOGY | Facility: HOSPITAL | Age: 27
End: 2022-04-19

## 2022-04-19 ENCOUNTER — HOSPITAL ENCOUNTER (OUTPATIENT)
Facility: HOSPITAL | Age: 27
Setting detail: HOSPITAL OUTPATIENT SURGERY
Discharge: HOME | End: 2022-04-19
Attending: SURGERY | Admitting: SURGERY

## 2022-04-19 ENCOUNTER — ANESTHESIA (OUTPATIENT)
Dept: GASTROENTEROLOGY | Facility: HOSPITAL | Age: 27
End: 2022-04-19

## 2022-04-19 VITALS
DIASTOLIC BLOOD PRESSURE: 69 MMHG | OXYGEN SATURATION: 99 % | SYSTOLIC BLOOD PRESSURE: 107 MMHG | HEART RATE: 63 BPM | RESPIRATION RATE: 18 BRPM | HEIGHT: 68 IN | BODY MASS INDEX: 44.38 KG/M2 | WEIGHT: 292.8 LBS

## 2022-04-19 DIAGNOSIS — R12 HEARTBURN: ICD-10-CM

## 2022-04-19 DIAGNOSIS — Z01.818 PRE-OP EVALUATION: ICD-10-CM

## 2022-04-19 DIAGNOSIS — E66.01 OBESITY, CLASS III, BMI 40-49.9 (MORBID OBESITY): ICD-10-CM

## 2022-04-19 LAB
B-HCG UR QL: NEGATIVE
EXPIRATION DATE: NORMAL
INTERNAL NEGATIVE CONTROL: NEGATIVE
INTERNAL POSITIVE CONTROL: POSITIVE
Lab: NORMAL

## 2022-04-19 PROCEDURE — 25010000002 PROPOFOL 10 MG/ML EMULSION: Performed by: ANESTHESIOLOGY

## 2022-04-19 PROCEDURE — 81025 URINE PREGNANCY TEST: CPT | Performed by: SURGERY

## 2022-04-19 PROCEDURE — 87081 CULTURE SCREEN ONLY: CPT | Performed by: SURGERY

## 2022-04-19 PROCEDURE — 43239 EGD BIOPSY SINGLE/MULTIPLE: CPT | Performed by: SURGERY

## 2022-04-19 RX ORDER — SODIUM CHLORIDE, SODIUM LACTATE, POTASSIUM CHLORIDE, CALCIUM CHLORIDE 600; 310; 30; 20 MG/100ML; MG/100ML; MG/100ML; MG/100ML
30 INJECTION, SOLUTION INTRAVENOUS CONTINUOUS PRN
Status: DISCONTINUED | OUTPATIENT
Start: 2022-04-19 | End: 2022-04-19 | Stop reason: HOSPADM

## 2022-04-19 RX ORDER — LIDOCAINE HYDROCHLORIDE 20 MG/ML
INJECTION, SOLUTION INFILTRATION; PERINEURAL AS NEEDED
Status: DISCONTINUED | OUTPATIENT
Start: 2022-04-19 | End: 2022-04-19 | Stop reason: SURG

## 2022-04-19 RX ORDER — PROPOFOL 10 MG/ML
VIAL (ML) INTRAVENOUS AS NEEDED
Status: DISCONTINUED | OUTPATIENT
Start: 2022-04-19 | End: 2022-04-19 | Stop reason: SURG

## 2022-04-19 RX ORDER — PROPOFOL 10 MG/ML
VIAL (ML) INTRAVENOUS CONTINUOUS PRN
Status: DISCONTINUED | OUTPATIENT
Start: 2022-04-19 | End: 2022-04-19 | Stop reason: SURG

## 2022-04-19 RX ADMIN — Medication 225 MCG/KG/MIN: at 07:31

## 2022-04-19 RX ADMIN — LIDOCAINE HYDROCHLORIDE 60 MG: 20 INJECTION, SOLUTION INFILTRATION; PERINEURAL at 07:31

## 2022-04-19 RX ADMIN — SODIUM CHLORIDE, POTASSIUM CHLORIDE, SODIUM LACTATE AND CALCIUM CHLORIDE 30 ML/HR: 600; 310; 30; 20 INJECTION, SOLUTION INTRAVENOUS at 06:57

## 2022-04-19 RX ADMIN — PROPOFOL 200 MG: 10 INJECTION, EMULSION INTRAVENOUS at 07:31

## 2022-04-19 NOTE — ANESTHESIA PREPROCEDURE EVALUATION
Anesthesia Evaluation     Patient summary reviewed and Nursing notes reviewed                Airway   Mallampati: III  TM distance: >3 FB  Neck ROM: full  Possible difficult intubation  Dental - normal exam     Pulmonary - normal exam   Cardiovascular - normal exam        Neuro/Psych  (+) psychiatric history Anxiety,    GI/Hepatic/Renal/Endo    (+) obesity, morbid obesity,      Musculoskeletal     Abdominal   (+) obese,    Substance History      OB/GYN          Other                        Anesthesia Plan    ASA 3     MAC     intravenous induction     Anesthetic plan, all risks, benefits, and alternatives have been provided, discussed and informed consent has been obtained with: patient.        CODE STATUS:

## 2022-04-19 NOTE — ANESTHESIA POSTPROCEDURE EVALUATION
Patient: Randi Martell    Procedure Summary     Date: 04/19/22 Room / Location:  [facility]    Anesthesia Start: 0727 Anesthesia Stop: 0737    Procedure: ESOPHAGOGASTRODUODENOSCOPY WITH BIOPSY (N/A Esophagus) Diagnosis:       Obesity, Class III, BMI 40-49.9 (morbid obesity) ([facility])      Heartburn      Pre-op evaluation      (Obesity, Class III, BMI 40-49.9 (morbid obesity) ([facility]) [E66.01])      (Heartburn [R12])      (Pre-op evaluation [Z01.818])    Surgeons: Kiran Staples Jr., MD Provider: Misael Leiva MD    Anesthesia Type: MAC ASA Status: 3          Anesthesia Type: MAC    Vitals  No vitals data found for the desired time range.          Post Anesthesia Care and Evaluation    Patient location during evaluation: PHASE II  Patient participation: complete - patient participated  Level of consciousness: awake and alert  Pain management: adequate  Airway patency: patent  Anesthetic complications: No anesthetic complications  PONV Status: none  Cardiovascular status: acceptable and hemodynamically stable  Respiratory status: acceptable, nonlabored ventilation and spontaneous ventilation  Hydration status: acceptable

## 2022-04-19 NOTE — H&P
Patient Care Team:  Zoe Campbell APRN as PCP - General (Family Medicine)    Chief complaint Heartburn and in need of preoperative clearance prior to surgery    Subjective     Patient is a 26 y.o. female who is a patient of ours and has undergone our extensive initial evaluation for bariatric surgery and needs to proceed with upper endoscopy for preoperative clearance prior to proceeding with surgery.  Please see the initial history and physical for further detailed information.      Review of Systems   Pertinent items are noted in HPI and no changes since last visit.    Past Medical History:   Diagnosis Date   • Anemia    • Has never received COVID-19 vaccine    • Jaw pain    • S/P  section 2021     Past Surgical History:   Procedure Laterality Date   •  SECTION N/A 2021    Procedure:  SECTION PRIMARY;  Surgeon: Clarita Hermosillo MD;  Location: [facility] LABOR DELIVERY;  Service: Obstetrics/Gynecology;  Laterality: N/A;     Family History   Problem Relation Age of Onset   • Depression Mother    • Obesity Mother    • Hypertension Mother    • Sleep apnea Mother    • Diabetes Father    • Heart disease Father    • Depression Father    • Obesity Father    • Hypertension Father    • Sleep apnea Father    • No Known Problems Sister    • Rheum arthritis Brother    • No Known Problems Brother    • Stroke Maternal Grandmother         also had head trauma   • Obesity Maternal Grandmother    • Obesity Maternal Grandfather    • Heart attack Paternal Grandmother    • Diabetes Paternal Grandmother    • Melanoma Paternal Grandfather    • Diabetes Paternal Grandfather    • Malig Hyperthermia Neg Hx      Social History     Tobacco Use   • Smoking status: Never Smoker   • Smokeless tobacco: Never Used   Vaping Use   • Vaping Use: Never used   Substance Use Topics   • Alcohol use: Never   • Drug use: Never     Medications Prior to Admission   Medication Sig Dispense Refill Last Dose   • ascorbic  "acid (VITAMIN C) 100 MG tablet Take 1 tablet by mouth 2 (Two) Times a Day.      • Etonogestrel (NEXPLANON) 68 MG implant subdermal implant Inject 1 each into the appropriate area of the skin as directed by provider 1 (One) Time.      • ferrous sulfate 325 (65 FE) MG tablet Take 1 tablet by mouth Daily With Breakfast. (Patient taking differently: Take 325 mg by mouth 2 (Two) Times a Day With Meals.) 90 tablet 0      Allergies:  Pollen extract    Vital Signs  See PreOp record       Flowsheet Rows    Flowsheet Row First Filed Value   Admission Height 172.7 cm (68\") Documented at 04/18/2022 1034   Admission Weight --           Physical Exam:   Heart: RR  Lungs: CTA B  Abd: soft and NT/ND  Ext: no clubbing, cyanosis    Results Review:    I have reviewed the patient's clinical results      Heartburn    Obesity, Class III, BMI 40-49.9 (morbid obesity) (HCC)    Pre-op evaluation      The risks and benefits of the procedure were discussed with the patient in detail and all questions were answered.  Possibility of perforation, bleeding, aspiration, anoxic brain injury, respiratory and/or cardiac arrest and death were discussed.  Consent will be signed and witnessed..     Kiran Staples MD  04/19/22  06:33 EDT  Time: Approximately 15 minutes was spent with the patient.  All of the patients questions were answered.    "

## 2022-04-19 NOTE — OP NOTE
Surgeon: Kiran Staples Jr., M.D.    Preoperative Diagnosis: Dyspepsia    Postoperative Diagnosis: Same    Procedure Performed: Transoral esophagogastroduodenoscopy with gastric antral biopsies    Indications: 26-year-old female who presents for preoperative valuation.  Patient does not take H2 blocker or PPI on regular basis    Procedure:     The procedure, indications, preparation and potential complications were explained to the patient, who indicated understanding and signed the corresponding consent forms.  The patient was identified, taken to the endoscopy suite, and placed on the left side down decubitus position.  The patient underwent a MAC anesthesia and was appropriately monitored through the case by the anesthesia personnel with continuous pulse oximetry, blood pressure, and cardiac monitoring.  A bite block was placed.  After adequate IV sedation and using a transoral technique a lubed flexible endoscope was placed in the hypopharynx and advanced to the second portion of the duodenum without difficulty. The scope was then withdrawn back into the antrum of the stomach.  Cold forcep biopsies of the antrum were taken to rule out Helicobacter pylori.  The scope was retroflexed noting the body, fundus and cardia.  The scope was then withdrawn back into the esophagus after decompressing the stomach.  The Z line was noted and GE junction measured from the incisors.  The scope was then completely withdrawn.  The patient tolerated the procedure well and left the endoscopy suite in stable condition.  The findings are listed below.    Duodenum: Unremarkable  Antrum: Unremarkable  Body/Fundus: Unremarkable  Cardia: Unremarkable  Esophagus: Z-line regular, no erosive esophagitis    Recommendations:     Await biopsy results and follow-up in the office

## 2022-04-19 NOTE — DISCHARGE INSTRUCTIONS
For the next 24 hours patient needs to be with a responsible adult.    For 24 hours DO NOT drive, operate machinery, appliances, drink alcohol, make important decisions or sign legal documents.    Start with a light or bland diet if you are feeling sick to your stomach otherwise advance to regular diet as tolerated.    Follow recommendations on procedure report if provided by your doctor.    Call Dr Jaimes for problems [phone]    Problems may include but not limited to: large amounts of bleeding, trouble breathing, repeated vomiting, severe unrelieved pain, fever or chills.

## 2022-04-20 LAB — UREASE TISS QL: NEGATIVE

## 2022-04-21 ENCOUNTER — TELEPHONE (OUTPATIENT)
Dept: BARIATRICS/WEIGHT MGMT | Facility: CLINIC | Age: 27
End: 2022-04-21

## 2022-04-21 NOTE — TELEPHONE ENCOUNTER
----- Message from Kiran Staples Jr., MD sent at 4/20/2022  2:34 PM EDT -----  Please call patient with negative results.

## 2022-06-07 ENCOUNTER — PREP FOR SURGERY (OUTPATIENT)
Dept: OTHER | Facility: HOSPITAL | Age: 27
End: 2022-06-07

## 2022-06-07 DIAGNOSIS — E66.01 OBESITY, CLASS III, BMI 40-49.9 (MORBID OBESITY): Primary | ICD-10-CM

## 2022-06-07 RX ORDER — SCOLOPAMINE TRANSDERMAL SYSTEM 1 MG/1
1 PATCH, EXTENDED RELEASE TRANSDERMAL CONTINUOUS
Status: CANCELLED | OUTPATIENT
Start: 2022-06-07 | End: 2022-06-10

## 2022-06-07 RX ORDER — SODIUM CHLORIDE 0.9 % (FLUSH) 0.9 %
3 SYRINGE (ML) INJECTION EVERY 12 HOURS SCHEDULED
Status: CANCELLED | OUTPATIENT
Start: 2022-06-07

## 2022-06-07 RX ORDER — PROMETHAZINE HYDROCHLORIDE 12.5 MG/1
12.5 TABLET ORAL ONCE AS NEEDED
Status: CANCELLED | OUTPATIENT
Start: 2022-06-07

## 2022-06-07 RX ORDER — SODIUM CHLORIDE, SODIUM LACTATE, POTASSIUM CHLORIDE, CALCIUM CHLORIDE 600; 310; 30; 20 MG/100ML; MG/100ML; MG/100ML; MG/100ML
100 INJECTION, SOLUTION INTRAVENOUS CONTINUOUS
Status: CANCELLED | OUTPATIENT
Start: 2022-06-07

## 2022-06-07 RX ORDER — PANTOPRAZOLE SODIUM 40 MG/10ML
40 INJECTION, POWDER, LYOPHILIZED, FOR SOLUTION INTRAVENOUS ONCE
Status: CANCELLED | OUTPATIENT
Start: 2022-06-07 | End: 2022-06-07

## 2022-06-07 RX ORDER — METOCLOPRAMIDE HYDROCHLORIDE 5 MG/ML
10 INJECTION INTRAMUSCULAR; INTRAVENOUS ONCE
Status: CANCELLED | OUTPATIENT
Start: 2022-06-07 | End: 2022-06-07

## 2022-06-07 RX ORDER — GABAPENTIN 250 MG/5ML
300 SOLUTION ORAL ONCE
Status: CANCELLED | OUTPATIENT
Start: 2022-06-07 | End: 2022-06-07

## 2022-06-07 RX ORDER — SODIUM CHLORIDE 0.9 % (FLUSH) 0.9 %
3-10 SYRINGE (ML) INJECTION AS NEEDED
Status: CANCELLED | OUTPATIENT
Start: 2022-06-07

## 2022-06-07 RX ORDER — PROMETHAZINE HYDROCHLORIDE 25 MG/1
25 SUPPOSITORY RECTAL ONCE AS NEEDED
Status: CANCELLED | OUTPATIENT
Start: 2022-06-07

## 2022-06-07 RX ORDER — CEFAZOLIN SODIUM IN 0.9 % NACL 3 G/100 ML
3 INTRAVENOUS SOLUTION, PIGGYBACK (ML) INTRAVENOUS
Status: CANCELLED | OUTPATIENT
Start: 2022-06-07

## 2022-06-07 RX ORDER — CHLORHEXIDINE GLUCONATE 0.12 MG/ML
15 RINSE ORAL SEE ADMIN INSTRUCTIONS
Status: CANCELLED | OUTPATIENT
Start: 2022-06-07

## 2022-06-28 DIAGNOSIS — E66.01 OBESITY, CLASS III, BMI 40-49.9 (MORBID OBESITY): Primary | ICD-10-CM

## 2022-06-30 ENCOUNTER — CONSULT (OUTPATIENT)
Dept: BARIATRICS/WEIGHT MGMT | Facility: CLINIC | Age: 27
End: 2022-06-30

## 2022-06-30 ENCOUNTER — LAB (OUTPATIENT)
Dept: LAB | Facility: HOSPITAL | Age: 27
End: 2022-06-30

## 2022-06-30 VITALS
DIASTOLIC BLOOD PRESSURE: 76 MMHG | HEIGHT: 68 IN | HEART RATE: 82 BPM | TEMPERATURE: 98 F | SYSTOLIC BLOOD PRESSURE: 104 MMHG | BODY MASS INDEX: 44.41 KG/M2 | OXYGEN SATURATION: 99 % | WEIGHT: 293 LBS

## 2022-06-30 DIAGNOSIS — G89.29 CHRONIC LOW BACK PAIN, UNSPECIFIED BACK PAIN LATERALITY, UNSPECIFIED WHETHER SCIATICA PRESENT: ICD-10-CM

## 2022-06-30 DIAGNOSIS — E66.01 OBESITY, CLASS III, BMI 40-49.9 (MORBID OBESITY): Primary | ICD-10-CM

## 2022-06-30 DIAGNOSIS — E66.01 OBESITY, CLASS III, BMI 40-49.9 (MORBID OBESITY): ICD-10-CM

## 2022-06-30 DIAGNOSIS — Z71.3 DIETARY COUNSELING: ICD-10-CM

## 2022-06-30 DIAGNOSIS — D50.9 IRON DEFICIENCY ANEMIA, UNSPECIFIED IRON DEFICIENCY ANEMIA TYPE: ICD-10-CM

## 2022-06-30 DIAGNOSIS — M25.562 ARTHRALGIA OF BOTH KNEES: ICD-10-CM

## 2022-06-30 DIAGNOSIS — K59.00 CONSTIPATION, UNSPECIFIED CONSTIPATION TYPE: ICD-10-CM

## 2022-06-30 DIAGNOSIS — M54.50 CHRONIC LOW BACK PAIN, UNSPECIFIED BACK PAIN LATERALITY, UNSPECIFIED WHETHER SCIATICA PRESENT: ICD-10-CM

## 2022-06-30 DIAGNOSIS — M25.561 ARTHRALGIA OF BOTH KNEES: ICD-10-CM

## 2022-06-30 PROBLEM — Z01.818 PRE-OP EVALUATION: Status: RESOLVED | Noted: 2022-02-15 | Resolved: 2022-06-30

## 2022-06-30 LAB
ALBUMIN SERPL-MCNC: 3.4 G/DL (ref 3.5–5.2)
ALBUMIN/GLOB SERPL: 1 G/DL
ALP SERPL-CCNC: 87 U/L (ref 39–117)
ALT SERPL W P-5'-P-CCNC: 11 U/L (ref 1–33)
ANION GAP SERPL CALCULATED.3IONS-SCNC: 9.7 MMOL/L (ref 5–15)
AST SERPL-CCNC: 13 U/L (ref 1–32)
BILIRUB SERPL-MCNC: 0.3 MG/DL (ref 0–1.2)
BUN SERPL-MCNC: 13 MG/DL (ref 6–20)
BUN/CREAT SERPL: 18.1 (ref 7–25)
CALCIUM SPEC-SCNC: 8.6 MG/DL (ref 8.6–10.5)
CHLORIDE SERPL-SCNC: 105 MMOL/L (ref 98–107)
CO2 SERPL-SCNC: 25.3 MMOL/L (ref 22–29)
CREAT SERPL-MCNC: 0.72 MG/DL (ref 0.57–1)
DEPRECATED RDW RBC AUTO: 42.2 FL (ref 37–54)
EGFRCR SERPLBLD CKD-EPI 2021: 117 ML/MIN/1.73
ERYTHROCYTE [DISTWIDTH] IN BLOOD BY AUTOMATED COUNT: 15.9 % (ref 12.3–15.4)
GLOBULIN UR ELPH-MCNC: 3.3 GM/DL
GLUCOSE SERPL-MCNC: 81 MG/DL (ref 65–99)
HCT VFR BLD AUTO: 35.3 % (ref 34–46.6)
HGB BLD-MCNC: 10.9 G/DL (ref 12–15.9)
MCH RBC QN AUTO: 23.4 PG (ref 26.6–33)
MCHC RBC AUTO-ENTMCNC: 30.9 G/DL (ref 31.5–35.7)
MCV RBC AUTO: 75.9 FL (ref 79–97)
PLATELET # BLD AUTO: 440 10*3/MM3 (ref 140–450)
PMV BLD AUTO: 11 FL (ref 6–12)
POTASSIUM SERPL-SCNC: 4.6 MMOL/L (ref 3.5–5.2)
PROT SERPL-MCNC: 6.7 G/DL (ref 6–8.5)
RBC # BLD AUTO: 4.65 10*6/MM3 (ref 3.77–5.28)
SODIUM SERPL-SCNC: 140 MMOL/L (ref 136–145)
WBC NRBC COR # BLD: 12.78 10*3/MM3 (ref 3.4–10.8)

## 2022-06-30 PROCEDURE — 80053 COMPREHEN METABOLIC PANEL: CPT

## 2022-06-30 PROCEDURE — 85027 COMPLETE CBC AUTOMATED: CPT

## 2022-06-30 PROCEDURE — 99215 OFFICE O/P EST HI 40 MIN: CPT | Performed by: SURGERY

## 2022-06-30 PROCEDURE — 36415 COLL VENOUS BLD VENIPUNCTURE: CPT

## 2022-06-30 RX ORDER — CHOLECALCIFEROL (VITAMIN D3) 125 MCG
500 CAPSULE ORAL DAILY
COMMUNITY

## 2022-06-30 RX ORDER — URSODIOL 300 MG/1
300 CAPSULE ORAL 2 TIMES DAILY
Qty: 60 CAPSULE | Refills: 5 | Status: SHIPPED | OUTPATIENT
Start: 2022-06-30

## 2022-06-30 RX ORDER — ENOXAPARIN SODIUM 100 MG/ML
40 INJECTION SUBCUTANEOUS EVERY 12 HOURS SCHEDULED
Qty: 11.2 ML | Refills: 0 | Status: SHIPPED | OUTPATIENT
Start: 2022-06-30 | End: 2022-07-14

## 2022-06-30 NOTE — PATIENT INSTRUCTIONS
Bariatric Manual    You were provided a manual specific to the procedure that you have chosen.  Please refer to that with any questions or call the office at [phone]

## 2022-06-30 NOTE — H&P
Bariatric Consult:  Referred by Zoe Campbell APRN    Randimayra Martell is here today for consult on No chief complaint on file.      History of Present Illness:     Randi Martell is a 27 y.o. female with morbid obesity with co-morbidities including back pain and knee pain who presents for surgical consultation for the above procedure. Randi has completed the initial intake visit and has been examined by our nurse practitioner, dietician, psychologist and underwent the extensive educational teaching process under the guidance of our bariatric coordinator and myself. Randi also has seen the educational video VALERIE on the surgical procedure if available. Randi attended today more educational teaching from our bariatric coordinator and myself. Randi has had an extensive medical workup including a visit with their primary care physician, EKG, chest radiograph, blood work, EGD or UGI and possibly further testing. These have been reviewed by me and discussed with the patient. Randi is now ready to proceed with surgery. Randi presently denies nausea, vomiting, fever, chills, chest pain, shortness of air, melena, hematochezia, hemetemesis, dysuria, frequency, hematuria, jaundice or abdominal pain.       Past Medical History:   Diagnosis Date   • Anemia    • Has never received COVID-19 vaccine    • Jaw pain    • S/P  section 2021       Encounter Diagnoses   Name Primary?   • Obesity, Class III, BMI 40-49.9 (morbid obesity) ([facility]) Yes   • Dietary counseling    • Constipation, unspecified constipation type    • Chronic low back pain, unspecified back pain laterality, unspecified whether sciatica present    • Arthralgia of both knees    • Iron deficiency anemia, unspecified iron deficiency anemia type        Past Surgical History:   Procedure Laterality Date   •  SECTION N/A 2021    Procedure:  SECTION PRIMARY;  Surgeon: Clarita Hermosillo MD;  Location:   [facility]IV[facility];  Service: Obstetrics/Gynecology;  Laterality: N/A;   • ENDOSCOPY N/A 4/19/2022    Procedure: ESOPHAGOGASTRODUODENOSCOPY WITH BIOPSY;  Surgeon: Kiran Staples Jr., MD;  Location: [facility] ENDOSCOPY;  Service: General;  Laterality: N/A;  PRE- DYSPEPSIA  POST- GASTRITIS       Patient Active Problem List   Diagnosis   • Intermittent claudication (HCC)   • Heartburn   • Constipation   • Heavy periods   • Knee joint pain   • Anxiety   • Chronic back pain   • Anemia   • Obesity, Class III, BMI 40-49.9 (morbid obesity) (HCC)   • Dietary counseling       Allergies   Allergen Reactions   • Pollen Extract Other (See Comments)     HEAD CONGESTION         Current Outpatient Medications:   •  ascorbic acid (VITAMIN C) 100 MG tablet, Take 1 tablet by mouth 2 (Two) Times a Day., Disp: , Rfl:   •  Docusate Calcium (SB STOOL SOFTENER PO), Take  by mouth., Disp: , Rfl:   •  Etonogestrel (NEXPLANON) 68 MG implant subdermal implant, Inject 1 each into the appropriate area of the skin as directed by provider 1 (One) Time., Disp: , Rfl:   •  ferrous sulfate 325 (65 FE) MG tablet, Take 1 tablet by mouth Daily With Breakfast. (Patient taking differently: Take 325 mg by mouth 2 (Two) Times a Day With Meals.), Disp: 90 tablet, Rfl: 0  •  vitamin B-12 (CYANOCOBALAMIN) 500 MCG tablet, Take 500 mcg by mouth Daily., Disp: , Rfl:   •  Enoxaparin Sodium (LOVENOX) 40 MG/0.4ML solution prefilled syringe syringe, Inject 0.4 mL under the skin into the appropriate area as directed Every 12 (Twelve) Hours for 14 days. Start after surgery unless instructed otherwise, Disp: 11.2 mL, Rfl: 0  •  folic acid-vit B6-vit B12 (FOLBEE) 2.5-25-1 MG tablet tablet, Take 1 tablet by mouth Daily., Disp: 40 tablet, Rfl: 0  •  ursodiol (Actigall) 300 MG capsule, Take 1 capsule by mouth 2 (Two) Times a Day., Disp: 60 capsule, Rfl: 5    Social History     Socioeconomic History   • Marital status: Single   • Number of children: 1   Tobacco Use   •  Smoking status: Never Smoker   • Smokeless tobacco: Never Used   Vaping Use   • Vaping Use: Never used   Substance and Sexual Activity   • Alcohol use: Never   • Drug use: Never   • Sexual activity: Defer       Family History   Problem Relation Age of Onset   • Depression Mother    • Obesity Mother    • Hypertension Mother    • Sleep apnea Mother    • Diabetes Father    • Heart disease Father    • Depression Father    • Obesity Father    • Hypertension Father    • Sleep apnea Father    • No Known Problems Sister    • Rheum arthritis Brother    • No Known Problems Brother    • Stroke Maternal Grandmother         also had head trauma   • Obesity Maternal Grandmother    • Obesity Maternal Grandfather    • Heart attack Paternal Grandmother    • Diabetes Paternal Grandmother    • Melanoma Paternal Grandfather    • Diabetes Paternal Grandfather    • Malig Hyperthermia Neg Hx        Review of Systems:  Review of Systems   Constitutional: Positive for fatigue.   Gastrointestinal: Positive for constipation.   Musculoskeletal: Positive for arthralgias and back pain.   All other systems reviewed and are negative.        Physical Exam:    Vital Signs:  Weight: 134 kg (295 lb)   Body mass index is 44.87 kg/m².  Temp: 98 °F (36.7 °C)   Heart Rate: 82   BP: 104/76       Physical Exam  Vitals reviewed.   HENT:      Head: Normocephalic and atraumatic.      Mouth/Throat:      Mouth: Mucous membranes are moist.      Pharynx: Oropharynx is clear.   Eyes:      General: No scleral icterus.     Extraocular Movements: Extraocular movements intact.      Conjunctiva/sclera: Conjunctivae normal.      Pupils: Pupils are equal, round, and reactive to light.   Neck:      Thyroid: No thyromegaly.   Cardiovascular:      Rate and Rhythm: Normal rate.   Pulmonary:      Effort: Pulmonary effort is normal. No respiratory distress.      Breath sounds: Normal breath sounds. No stridor. No wheezing or rhonchi.   Abdominal:      General: Bowel sounds are  normal.      Palpations: Abdomen is soft.      Tenderness: There is no abdominal tenderness. There is no right CVA tenderness, left CVA tenderness, guarding or rebound.      Hernia: No hernia is present.   Musculoskeletal:         General: Normal range of motion.      Cervical back: Normal range of motion and neck supple.   Lymphadenopathy:      Cervical: No cervical adenopathy.   Skin:     General: Skin is warm and dry.      Findings: No erythema.   Neurological:      Mental Status: She is alert and oriented to person, place, and time.   Psychiatric:         Mood and Affect: Mood normal.         Behavior: Behavior normal.         Thought Content: Thought content normal.         Judgment: Judgment normal.           Assessment:    Randi Martell is a 27 y.o. year old female with medically complicated severe obesity with a BMI of Body mass index is 44.87 kg/m². and multiple co-morbidities listed in the encounter diagnosis.    I think she is an appropriate candidate for this surgery, and is ready to proceed.      Plan/Discussion/Summary:  No hiatal hernia per me.  No PPI.  H. pylori negative    The patient has returned to the office for a surgical consultation and has requested to proceed with gastric sleeve.  I have had the opportunity to obtain a history, examine the patient and review the patient's chart. The procedure could be done laparoscopically and or robotically.    The patient understands that surgery is a tool and that weight loss is not guaranteed but only seen in the context of appropriate use, regular follow up, exercise and making appropriate food choices.     I personally discussed the potential complications of the laparoscopic gastric sleeve with this patient.  The patient is well aware of potential complications of the surgery that include but not limited to bleeding, infections, deep vein thrombosis, pulmonary embolism, pulmonary complications such as pneumonia, cardiac event, hernias, small  bowel obstruction, damage to the spleen or other organs, bowel injury, disfiguring scars, failure to lose weight, need for additional surgery, conversion to an open procedure and death.  The patient is also aware of complications which apply in particular to the gastric sleeve and can include but not limited to the leakage of gastric contents at the staple line, the development of an intra-abdominal abscess, gastroesophageal reflux disease, Pool's esophagus, ulcers, vitamin/mineral deficiencies, strictures, and the possibility of converting this procedure to a Richard-en-Y gastric bypass. The patient also understands the possibility of requiring an acid reducer medication for the rest of their life.    The risks, benefits, potential complications and alternative therapies were discussed at great length as outlined in our extensive consent forms, online consent and educational teaching processes.    The patient has confirmed the participation in the programs extensive educational activities.    All questions and concerns were answered to patient's satisfaction.  The patient now wishes to proceed with surgery.     The patient has agreed to a postoperative course of anitcoagulant therapy.      I instructed patient that the surgery could be laparoscopically and/or robotically.    I instructed patient to start on a H2 blocker or proton pump inhibitor if not already on one of these medications.    I explained in detail the procedures that we perform.  All of these procedures have a chance to convert to open if any technical challenges or complications do occur.  Bariatric surgery is not cosmetic surgery but rather a tool to help a patient make a life-long commitment lifestyle change including diet, exercise, behavior changes, and taking supplemental vitamins and minerals.    Problems after surgery may require more operations to correct them.    The risks, benefits, alternatives, and potential complications of all of the  procedures were explained in detail including, but not limited to death, anesthesia and medication adverse effect, deep venous thrombosis, pulmonary embolism, trocar site/incisional hernia, wound infection, abdominal infection, bleeding, failure to lose weight, gain weight, a change in body image, metabolic complications with vitamin deficiences and anemia.    Weight loss expectations were discussed with the patient in detail. The weight loss operations most commonly performed are the sleeve gastrectomy and the Richard-en-Y gastric bypass. These operations result in weight losses up to approximately 25-35% of initial body weight 12 to 24 months after surgery with the gastric bypass usually the higher percent of weight loss but depends on patient using the tool.    For the gastric bypass and loop duodenal switch (WATSON-S) the risks include but not limited to the following early complications:  Anastomotic leak/peritonitis, Richard/Alimentary/biliopancreatic limb obstruction, severe & minor wound infection/seroma, and nausea/vomiting.  Late complications can include but are not limited to malnutrition, vitamin deficiencies, frequent loose stools,  stomal stenosis, marginal ulcer, bowel obstruction, intussusception, internal, and incisional hernia.    Regarding the gastric sleeve, there is less long-term outcome data and higher risk of dysphagia and reflux leading to possible Pool's esophagus compared to a gastric bypass, as well as risk of internal visceral/organ injury, splenectomy, bleeding, infection, leak (which could require further intervention possible conversion to Richard-en-Y gastric bypass), stenosis and possibility of regaining weight.    Randi was counseled regarding diagnostic results, instructions for management, risk factor reductions, prognosis, patient and family education, impressions, risks and benefits of treatment options and importance of compliance with treatment. Total time of the encounter was  over 45 minutes counseling the patient regarding the procedure as above and reviewing as well as ordering labs, medications and the procedure.  The chart was also reviewed prior to seeing the patient reviewing previous testing, studies and labs.    Randi understands the surgical procedures and the different surgical options that are available.  She understands the lifestyle changes that are required after surgery and has agreed to follow the guidelines outlined in the weight management program.  She also expressed understanding of the risks involved and had all of female questions answered and desires to proceed.      Kiran Staples MD  6/30/2022

## 2022-07-18 ENCOUNTER — PRE-ADMISSION TESTING (OUTPATIENT)
Dept: PREADMISSION TESTING | Facility: HOSPITAL | Age: 27
End: 2022-07-18

## 2022-07-18 VITALS
SYSTOLIC BLOOD PRESSURE: 109 MMHG | TEMPERATURE: 97.6 F | HEART RATE: 60 BPM | RESPIRATION RATE: 20 BRPM | OXYGEN SATURATION: 100 % | HEIGHT: 68 IN | DIASTOLIC BLOOD PRESSURE: 78 MMHG | BODY MASS INDEX: 44.85 KG/M2

## 2022-07-18 DIAGNOSIS — E66.01 OBESITY, CLASS III, BMI 40-49.9 (MORBID OBESITY): ICD-10-CM

## 2022-07-18 LAB — SARS-COV-2 ORF1AB RESP QL NAA+PROBE: NOT DETECTED

## 2022-07-18 PROCEDURE — U0004 COV-19 TEST NON-CDC HGH THRU: HCPCS

## 2022-07-18 PROCEDURE — C9803 HOPD COVID-19 SPEC COLLECT: HCPCS

## 2022-07-18 RX ORDER — ACETAMINOPHEN 500 MG
500-1000 TABLET ORAL EVERY 6 HOURS PRN
COMMUNITY

## 2022-07-18 RX ORDER — CHLORHEXIDINE GLUCONATE 500 MG/1
1 CLOTH TOPICAL
COMMUNITY
End: 2022-07-21 | Stop reason: HOSPADM

## 2022-07-18 RX ORDER — MULTIVIT-MIN/IRON/FOLIC ACID/K 45-800-120
1 CAPSULE ORAL DAILY
COMMUNITY

## 2022-07-19 RX ORDER — ACETAMINOPHEN 10 MG/ML
1000 INJECTION, SOLUTION INTRAVENOUS ONCE
Status: CANCELLED | OUTPATIENT
Start: 2022-07-20

## 2022-07-20 ENCOUNTER — ANESTHESIA EVENT (OUTPATIENT)
Dept: PERIOP | Facility: HOSPITAL | Age: 27
End: 2022-07-20

## 2022-07-20 ENCOUNTER — ANESTHESIA (OUTPATIENT)
Dept: PERIOP | Facility: HOSPITAL | Age: 27
End: 2022-07-20

## 2022-07-20 ENCOUNTER — HOSPITAL ENCOUNTER (INPATIENT)
Facility: HOSPITAL | Age: 27
LOS: 1 days | Discharge: HOME | End: 2022-07-21
Attending: SURGERY | Admitting: SURGERY

## 2022-07-20 DIAGNOSIS — Z98.84 S/P LAPAROSCOPIC SLEEVE GASTRECTOMY: ICD-10-CM

## 2022-07-20 DIAGNOSIS — M54.50 CHRONIC LOW BACK PAIN, UNSPECIFIED BACK PAIN LATERALITY, UNSPECIFIED WHETHER SCIATICA PRESENT: ICD-10-CM

## 2022-07-20 DIAGNOSIS — R12 HEARTBURN: ICD-10-CM

## 2022-07-20 DIAGNOSIS — G89.29 CHRONIC LOW BACK PAIN, UNSPECIFIED BACK PAIN LATERALITY, UNSPECIFIED WHETHER SCIATICA PRESENT: ICD-10-CM

## 2022-07-20 DIAGNOSIS — E66.01 OBESITY, CLASS III, BMI 40-49.9 (MORBID OBESITY): ICD-10-CM

## 2022-07-20 DIAGNOSIS — I73.9 INTERMITTENT CLAUDICATION: ICD-10-CM

## 2022-07-20 DIAGNOSIS — Z71.3 DIETARY COUNSELING: Primary | ICD-10-CM

## 2022-07-20 DIAGNOSIS — D50.9 IRON DEFICIENCY ANEMIA, UNSPECIFIED IRON DEFICIENCY ANEMIA TYPE: ICD-10-CM

## 2022-07-20 DIAGNOSIS — F41.9 ANXIETY: ICD-10-CM

## 2022-07-20 DIAGNOSIS — M25.561 ARTHRALGIA OF BOTH KNEES: ICD-10-CM

## 2022-07-20 DIAGNOSIS — K59.00 CONSTIPATION, UNSPECIFIED CONSTIPATION TYPE: ICD-10-CM

## 2022-07-20 DIAGNOSIS — M25.562 ARTHRALGIA OF BOTH KNEES: ICD-10-CM

## 2022-07-20 LAB
B-HCG UR QL: NEGATIVE
EXPIRATION DATE: NORMAL
INTERNAL NEGATIVE CONTROL: NORMAL
INTERNAL POSITIVE CONTROL: NORMAL
Lab: NORMAL

## 2022-07-20 PROCEDURE — 25010000002 ONDANSETRON PER 1 MG: Performed by: SURGERY

## 2022-07-20 PROCEDURE — 25010000002 CEFAZOLIN PER 500 MG: Performed by: SURGERY

## 2022-07-20 PROCEDURE — 81025 URINE PREGNANCY TEST: CPT | Performed by: ANESTHESIOLOGY

## 2022-07-20 PROCEDURE — 25010000002 HYDROMORPHONE PER 4 MG: Performed by: NURSE ANESTHETIST, CERTIFIED REGISTERED

## 2022-07-20 PROCEDURE — 43775 LAP SLEEVE GASTRECTOMY: CPT | Performed by: NURSE PRACTITIONER

## 2022-07-20 PROCEDURE — 25010000002 PROPOFOL 10 MG/ML EMULSION: Performed by: NURSE ANESTHETIST, CERTIFIED REGISTERED

## 2022-07-20 PROCEDURE — C9399 UNCLASSIFIED DRUGS OR BIOLOG: HCPCS | Performed by: SURGERY

## 2022-07-20 PROCEDURE — 25010000002 FENTANYL CITRATE (PF) 50 MCG/ML SOLUTION: Performed by: NURSE ANESTHETIST, CERTIFIED REGISTERED

## 2022-07-20 PROCEDURE — 43775 LAP SLEEVE GASTRECTOMY: CPT | Performed by: SURGERY

## 2022-07-20 PROCEDURE — 0DB64Z3 EXCISION OF STOMACH, PERCUTANEOUS ENDOSCOPIC APPROACH, VERTICAL: ICD-10-PCS | Performed by: SURGERY

## 2022-07-20 PROCEDURE — 25010000002 MAGNESIUM SULFATE PER 500 MG OF MAGNESIUM: Performed by: NURSE ANESTHETIST, CERTIFIED REGISTERED

## 2022-07-20 PROCEDURE — 25010000002 DEXAMETHASONE PER 1 MG: Performed by: NURSE ANESTHETIST, CERTIFIED REGISTERED

## 2022-07-20 PROCEDURE — 25010000002 METOCLOPRAMIDE PER 10 MG: Performed by: SURGERY

## 2022-07-20 PROCEDURE — 25010000002 KETOROLAC TROMETHAMINE PER 15 MG: Performed by: SURGERY

## 2022-07-20 PROCEDURE — 25010000002 CLONIDINE PER 1 MG: Performed by: SURGERY

## 2022-07-20 PROCEDURE — 25010000002 ONDANSETRON PER 1 MG: Performed by: NURSE ANESTHETIST, CERTIFIED REGISTERED

## 2022-07-20 PROCEDURE — 25010000002 THIAMINE PER 100 MG: Performed by: SURGERY

## 2022-07-20 PROCEDURE — 25010000002 ROPIVACAINE PER 1 MG: Performed by: SURGERY

## 2022-07-20 PROCEDURE — 88307 TISSUE EXAM BY PATHOLOGIST: CPT | Performed by: SURGERY

## 2022-07-20 PROCEDURE — 8E0W4CZ ROBOTIC ASSISTED PROCEDURE OF TRUNK REGION, PERCUTANEOUS ENDOSCOPIC APPROACH: ICD-10-PCS | Performed by: SURGERY

## 2022-07-20 PROCEDURE — 25010000002 EPINEPHRINE 1 MG/ML SOLUTION 30 ML VIAL: Performed by: SURGERY

## 2022-07-20 PROCEDURE — 25010000002 AMISULPRIDE (ANTIEMETIC) 5 MG/2ML SOLUTION: Performed by: SURGERY

## 2022-07-20 DEVICE — SEALANT WND FIBRIN TISSEEL PREFIL/SYR/PRIMAFZ 4ML: Type: IMPLANTABLE DEVICE | Site: ABDOMEN | Status: FUNCTIONAL

## 2022-07-20 DEVICE — IMPLANTABLE DEVICE
Type: IMPLANTABLE DEVICE | Site: ABDOMEN | Status: FUNCTIONAL
Brand: TITAN SGS STANDARD GASTRIC STAPLER

## 2022-07-20 RX ORDER — DIPHENHYDRAMINE HYDROCHLORIDE 50 MG/ML
25 INJECTION INTRAMUSCULAR; INTRAVENOUS EVERY 4 HOURS PRN
Status: DISCONTINUED | OUTPATIENT
Start: 2022-07-20 | End: 2022-07-21 | Stop reason: HOSPADM

## 2022-07-20 RX ORDER — DIPHENHYDRAMINE HCL 25 MG
25 CAPSULE ORAL
Status: DISCONTINUED | OUTPATIENT
Start: 2022-07-20 | End: 2022-07-20 | Stop reason: HOSPADM

## 2022-07-20 RX ORDER — PROPOFOL 10 MG/ML
VIAL (ML) INTRAVENOUS AS NEEDED
Status: DISCONTINUED | OUTPATIENT
Start: 2022-07-20 | End: 2022-07-20 | Stop reason: SURG

## 2022-07-20 RX ORDER — ALBUTEROL SULFATE 2.5 MG/3ML
2.5 SOLUTION RESPIRATORY (INHALATION) EVERY 4 HOURS PRN
Status: DISCONTINUED | OUTPATIENT
Start: 2022-07-20 | End: 2022-07-21 | Stop reason: HOSPADM

## 2022-07-20 RX ORDER — ACETAMINOPHEN 500 MG
1000 TABLET ORAL EVERY 6 HOURS
Status: DISCONTINUED | OUTPATIENT
Start: 2022-07-20 | End: 2022-07-20 | Stop reason: SDUPTHER

## 2022-07-20 RX ORDER — ONDANSETRON 4 MG/1
4 TABLET, ORALLY DISINTEGRATING ORAL EVERY 4 HOURS PRN
Status: DISCONTINUED | OUTPATIENT
Start: 2022-07-20 | End: 2022-07-21 | Stop reason: HOSPADM

## 2022-07-20 RX ORDER — FAMOTIDINE 10 MG/ML
20 INJECTION, SOLUTION INTRAVENOUS EVERY 12 HOURS SCHEDULED
Status: DISCONTINUED | OUTPATIENT
Start: 2022-07-20 | End: 2022-07-21 | Stop reason: HOSPADM

## 2022-07-20 RX ORDER — FENTANYL CITRATE 50 UG/ML
50 INJECTION, SOLUTION INTRAMUSCULAR; INTRAVENOUS
Status: DISCONTINUED | OUTPATIENT
Start: 2022-07-20 | End: 2022-07-20 | Stop reason: HOSPADM

## 2022-07-20 RX ORDER — PROMETHAZINE HYDROCHLORIDE 12.5 MG/1
12.5 SUPPOSITORY RECTAL EVERY 4 HOURS PRN
Status: DISCONTINUED | OUTPATIENT
Start: 2022-07-20 | End: 2022-07-20 | Stop reason: SDUPTHER

## 2022-07-20 RX ORDER — ONDANSETRON 2 MG/ML
INJECTION INTRAMUSCULAR; INTRAVENOUS AS NEEDED
Status: DISCONTINUED | OUTPATIENT
Start: 2022-07-20 | End: 2022-07-20 | Stop reason: SURG

## 2022-07-20 RX ORDER — HYDROCODONE BITARTRATE AND ACETAMINOPHEN 7.5; 325 MG/1; MG/1
1 TABLET ORAL ONCE AS NEEDED
Status: COMPLETED | OUTPATIENT
Start: 2022-07-20 | End: 2022-07-20

## 2022-07-20 RX ORDER — IBUPROFEN 600 MG/1
600 TABLET ORAL ONCE AS NEEDED
Status: DISCONTINUED | OUTPATIENT
Start: 2022-07-20 | End: 2022-07-20 | Stop reason: HOSPADM

## 2022-07-20 RX ORDER — DEXAMETHASONE SODIUM PHOSPHATE 10 MG/ML
INJECTION INTRAMUSCULAR; INTRAVENOUS AS NEEDED
Status: DISCONTINUED | OUTPATIENT
Start: 2022-07-20 | End: 2022-07-20 | Stop reason: SURG

## 2022-07-20 RX ORDER — ACETAMINOPHEN 160 MG/5ML
975 SOLUTION ORAL EVERY 6 HOURS
Status: DISCONTINUED | OUTPATIENT
Start: 2022-07-20 | End: 2022-07-20 | Stop reason: SDUPTHER

## 2022-07-20 RX ORDER — MAGNESIUM SULFATE HEPTAHYDRATE 500 MG/ML
INJECTION, SOLUTION INTRAMUSCULAR; INTRAVENOUS AS NEEDED
Status: DISCONTINUED | OUTPATIENT
Start: 2022-07-20 | End: 2022-07-20 | Stop reason: SURG

## 2022-07-20 RX ORDER — HYDROMORPHONE HYDROCHLORIDE 2 MG/1
2 TABLET ORAL EVERY 4 HOURS PRN
Status: DISCONTINUED | OUTPATIENT
Start: 2022-07-20 | End: 2022-07-21 | Stop reason: HOSPADM

## 2022-07-20 RX ORDER — SODIUM CHLORIDE, SODIUM LACTATE, POTASSIUM CHLORIDE, CALCIUM CHLORIDE 600; 310; 30; 20 MG/100ML; MG/100ML; MG/100ML; MG/100ML
150 INJECTION, SOLUTION INTRAVENOUS CONTINUOUS
Status: DISCONTINUED | OUTPATIENT
Start: 2022-07-20 | End: 2022-07-21 | Stop reason: HOSPADM

## 2022-07-20 RX ORDER — ONDANSETRON 4 MG/1
4 TABLET, ORALLY DISINTEGRATING ORAL EVERY 4 HOURS PRN
Qty: 30 TABLET | Refills: 0 | Status: SHIPPED | OUTPATIENT
Start: 2022-07-20

## 2022-07-20 RX ORDER — SCOLOPAMINE TRANSDERMAL SYSTEM 1 MG/1
1 PATCH, EXTENDED RELEASE TRANSDERMAL CONTINUOUS
Status: DISCONTINUED | OUTPATIENT
Start: 2022-07-20 | End: 2022-07-21 | Stop reason: HOSPADM

## 2022-07-20 RX ORDER — DIPHENHYDRAMINE HYDROCHLORIDE 50 MG/ML
25 INJECTION INTRAMUSCULAR; INTRAVENOUS EVERY 4 HOURS PRN
Status: DISCONTINUED | OUTPATIENT
Start: 2022-07-20 | End: 2022-07-20 | Stop reason: SDUPTHER

## 2022-07-20 RX ORDER — DIPHENHYDRAMINE HYDROCHLORIDE 50 MG/ML
12.5 INJECTION INTRAMUSCULAR; INTRAVENOUS
Status: DISCONTINUED | OUTPATIENT
Start: 2022-07-20 | End: 2022-07-20 | Stop reason: HOSPADM

## 2022-07-20 RX ORDER — ONDANSETRON 2 MG/ML
4 INJECTION INTRAMUSCULAR; INTRAVENOUS EVERY 4 HOURS PRN
Status: DISCONTINUED | OUTPATIENT
Start: 2022-07-20 | End: 2022-07-21 | Stop reason: HOSPADM

## 2022-07-20 RX ORDER — CHLORHEXIDINE GLUCONATE 0.12 MG/ML
15 RINSE ORAL SEE ADMIN INSTRUCTIONS
Status: COMPLETED | OUTPATIENT
Start: 2022-07-20 | End: 2022-07-20

## 2022-07-20 RX ORDER — HYDROMORPHONE HYDROCHLORIDE 2 MG/1
2 TABLET ORAL EVERY 4 HOURS PRN
Qty: 18 TABLET | Refills: 0 | Status: SHIPPED | OUTPATIENT
Start: 2022-07-20 | End: 2022-07-23

## 2022-07-20 RX ORDER — ACETAMINOPHEN 160 MG/5ML
975 SOLUTION ORAL EVERY 6 HOURS
Status: DISCONTINUED | OUTPATIENT
Start: 2022-07-20 | End: 2022-07-21 | Stop reason: HOSPADM

## 2022-07-20 RX ORDER — GABAPENTIN 250 MG/5ML
300 SOLUTION ORAL EVERY 8 HOURS
Status: DISCONTINUED | OUTPATIENT
Start: 2022-07-20 | End: 2022-07-20 | Stop reason: SDUPTHER

## 2022-07-20 RX ORDER — HYDRALAZINE HYDROCHLORIDE 20 MG/ML
5 INJECTION INTRAMUSCULAR; INTRAVENOUS
Status: DISCONTINUED | OUTPATIENT
Start: 2022-07-20 | End: 2022-07-20 | Stop reason: HOSPADM

## 2022-07-20 RX ORDER — OXYCODONE AND ACETAMINOPHEN 7.5; 325 MG/1; MG/1
1 TABLET ORAL EVERY 4 HOURS PRN
Status: DISCONTINUED | OUTPATIENT
Start: 2022-07-20 | End: 2022-07-20 | Stop reason: HOSPADM

## 2022-07-20 RX ORDER — ONDANSETRON 4 MG/1
4 TABLET, FILM COATED ORAL EVERY 4 HOURS PRN
Status: DISCONTINUED | OUTPATIENT
Start: 2022-07-20 | End: 2022-07-21 | Stop reason: HOSPADM

## 2022-07-20 RX ORDER — ALBUTEROL SULFATE 2.5 MG/3ML
2.5 SOLUTION RESPIRATORY (INHALATION) EVERY 4 HOURS PRN
Status: DISCONTINUED | OUTPATIENT
Start: 2022-07-20 | End: 2022-07-20 | Stop reason: SDUPTHER

## 2022-07-20 RX ORDER — GABAPENTIN 250 MG/5ML
300 SOLUTION ORAL ONCE
Status: COMPLETED | OUTPATIENT
Start: 2022-07-20 | End: 2022-07-20

## 2022-07-20 RX ORDER — ACETAMINOPHEN 10 MG/ML
INJECTION, SOLUTION INTRAVENOUS AS NEEDED
Status: DISCONTINUED | OUTPATIENT
Start: 2022-07-20 | End: 2022-07-20 | Stop reason: SURG

## 2022-07-20 RX ORDER — METOCLOPRAMIDE HYDROCHLORIDE 5 MG/ML
10 INJECTION INTRAMUSCULAR; INTRAVENOUS EVERY 6 HOURS
Status: DISCONTINUED | OUTPATIENT
Start: 2022-07-20 | End: 2022-07-20 | Stop reason: SDUPTHER

## 2022-07-20 RX ORDER — SODIUM CHLORIDE 0.9 % (FLUSH) 0.9 %
3-10 SYRINGE (ML) INJECTION AS NEEDED
Status: DISCONTINUED | OUTPATIENT
Start: 2022-07-20 | End: 2022-07-20 | Stop reason: HOSPADM

## 2022-07-20 RX ORDER — PANTOPRAZOLE SODIUM 40 MG/10ML
40 INJECTION, POWDER, LYOPHILIZED, FOR SOLUTION INTRAVENOUS ONCE
Status: COMPLETED | OUTPATIENT
Start: 2022-07-20 | End: 2022-07-20

## 2022-07-20 RX ORDER — LIDOCAINE HYDROCHLORIDE 20 MG/ML
INJECTION, SOLUTION INFILTRATION; PERINEURAL AS NEEDED
Status: DISCONTINUED | OUTPATIENT
Start: 2022-07-20 | End: 2022-07-20 | Stop reason: SURG

## 2022-07-20 RX ORDER — LIDOCAINE HYDROCHLORIDE 10 MG/ML
0.5 INJECTION, SOLUTION EPIDURAL; INFILTRATION; INTRACAUDAL; PERINEURAL ONCE AS NEEDED
Status: DISCONTINUED | OUTPATIENT
Start: 2022-07-20 | End: 2022-07-20 | Stop reason: HOSPADM

## 2022-07-20 RX ORDER — EPHEDRINE SULFATE 50 MG/ML
5 INJECTION, SOLUTION INTRAVENOUS ONCE AS NEEDED
Status: DISCONTINUED | OUTPATIENT
Start: 2022-07-20 | End: 2022-07-20 | Stop reason: HOSPADM

## 2022-07-20 RX ORDER — FLUMAZENIL 0.1 MG/ML
0.2 INJECTION INTRAVENOUS AS NEEDED
Status: DISCONTINUED | OUTPATIENT
Start: 2022-07-20 | End: 2022-07-20 | Stop reason: HOSPADM

## 2022-07-20 RX ORDER — PROMETHAZINE HYDROCHLORIDE 25 MG/1
25 TABLET ORAL ONCE AS NEEDED
Status: DISCONTINUED | OUTPATIENT
Start: 2022-07-20 | End: 2022-07-20 | Stop reason: HOSPADM

## 2022-07-20 RX ORDER — PROMETHAZINE HYDROCHLORIDE 12.5 MG/1
12.5 SUPPOSITORY RECTAL EVERY 4 HOURS PRN
Status: DISCONTINUED | OUTPATIENT
Start: 2022-07-20 | End: 2022-07-21 | Stop reason: HOSPADM

## 2022-07-20 RX ORDER — FENTANYL CITRATE 50 UG/ML
INJECTION, SOLUTION INTRAMUSCULAR; INTRAVENOUS AS NEEDED
Status: DISCONTINUED | OUTPATIENT
Start: 2022-07-20 | End: 2022-07-20 | Stop reason: SURG

## 2022-07-20 RX ORDER — HYDROMORPHONE HYDROCHLORIDE 1 MG/ML
0.5 INJECTION, SOLUTION INTRAMUSCULAR; INTRAVENOUS; SUBCUTANEOUS
Status: DISCONTINUED | OUTPATIENT
Start: 2022-07-20 | End: 2022-07-20 | Stop reason: HOSPADM

## 2022-07-20 RX ORDER — NALOXONE HCL 0.4 MG/ML
0.1 VIAL (ML) INJECTION
Status: DISCONTINUED | OUTPATIENT
Start: 2022-07-20 | End: 2022-07-21 | Stop reason: HOSPADM

## 2022-07-20 RX ORDER — HYDRALAZINE HYDROCHLORIDE 20 MG/ML
10 INJECTION INTRAMUSCULAR; INTRAVENOUS
Status: DISCONTINUED | OUTPATIENT
Start: 2022-07-20 | End: 2022-07-20 | Stop reason: SDUPTHER

## 2022-07-20 RX ORDER — PROMETHAZINE HYDROCHLORIDE 25 MG/1
25 SUPPOSITORY RECTAL ONCE AS NEEDED
Status: DISCONTINUED | OUTPATIENT
Start: 2022-07-20 | End: 2022-07-20 | Stop reason: HOSPADM

## 2022-07-20 RX ORDER — ONDANSETRON 4 MG/1
4 TABLET, ORALLY DISINTEGRATING ORAL EVERY 4 HOURS PRN
Status: DISCONTINUED | OUTPATIENT
Start: 2022-07-20 | End: 2022-07-20 | Stop reason: SDUPTHER

## 2022-07-20 RX ORDER — SODIUM CHLORIDE 0.9 % (FLUSH) 0.9 %
3 SYRINGE (ML) INJECTION EVERY 12 HOURS SCHEDULED
Status: DISCONTINUED | OUTPATIENT
Start: 2022-07-20 | End: 2022-07-21 | Stop reason: HOSPADM

## 2022-07-20 RX ORDER — GABAPENTIN 300 MG/1
300 CAPSULE ORAL EVERY 8 HOURS
Status: DISCONTINUED | OUTPATIENT
Start: 2022-07-20 | End: 2022-07-20 | Stop reason: SDUPTHER

## 2022-07-20 RX ORDER — ONDANSETRON 4 MG/1
4 TABLET, FILM COATED ORAL EVERY 4 HOURS PRN
Status: DISCONTINUED | OUTPATIENT
Start: 2022-07-20 | End: 2022-07-20 | Stop reason: SDUPTHER

## 2022-07-20 RX ORDER — CYANOCOBALAMIN 1000 UG/ML
1000 INJECTION, SOLUTION INTRAMUSCULAR; SUBCUTANEOUS ONCE
Status: COMPLETED | OUTPATIENT
Start: 2022-07-21 | End: 2022-07-21

## 2022-07-20 RX ORDER — MIRTAZAPINE 15 MG/1
15 TABLET, ORALLY DISINTEGRATING ORAL NIGHTLY
Status: DISCONTINUED | OUTPATIENT
Start: 2022-07-20 | End: 2022-07-21 | Stop reason: HOSPADM

## 2022-07-20 RX ORDER — PROCHLORPERAZINE EDISYLATE 5 MG/ML
10 INJECTION INTRAMUSCULAR; INTRAVENOUS EVERY 6 HOURS PRN
Status: DISCONTINUED | OUTPATIENT
Start: 2022-07-20 | End: 2022-07-20 | Stop reason: SDUPTHER

## 2022-07-20 RX ORDER — HYDROMORPHONE HYDROCHLORIDE 1 MG/ML
0.5 INJECTION, SOLUTION INTRAMUSCULAR; INTRAVENOUS; SUBCUTANEOUS
Status: DISCONTINUED | OUTPATIENT
Start: 2022-07-20 | End: 2022-07-20 | Stop reason: SDUPTHER

## 2022-07-20 RX ORDER — LORAZEPAM 1 MG/1
1 TABLET ORAL EVERY 12 HOURS PRN
Status: DISCONTINUED | OUTPATIENT
Start: 2022-07-20 | End: 2022-07-21 | Stop reason: HOSPADM

## 2022-07-20 RX ORDER — KETAMINE HYDROCHLORIDE 10 MG/ML
INJECTION INTRAMUSCULAR; INTRAVENOUS AS NEEDED
Status: DISCONTINUED | OUTPATIENT
Start: 2022-07-20 | End: 2022-07-20 | Stop reason: SURG

## 2022-07-20 RX ORDER — LABETALOL HYDROCHLORIDE 5 MG/ML
5 INJECTION, SOLUTION INTRAVENOUS
Status: DISCONTINUED | OUTPATIENT
Start: 2022-07-20 | End: 2022-07-20 | Stop reason: HOSPADM

## 2022-07-20 RX ORDER — ACETAMINOPHEN 500 MG
1000 TABLET ORAL EVERY 6 HOURS
Status: DISCONTINUED | OUTPATIENT
Start: 2022-07-20 | End: 2022-07-21 | Stop reason: HOSPADM

## 2022-07-20 RX ORDER — ROCURONIUM BROMIDE 10 MG/ML
INJECTION, SOLUTION INTRAVENOUS AS NEEDED
Status: DISCONTINUED | OUTPATIENT
Start: 2022-07-20 | End: 2022-07-20 | Stop reason: SURG

## 2022-07-20 RX ORDER — NALOXONE HCL 0.4 MG/ML
0.1 VIAL (ML) INJECTION
Status: DISCONTINUED | OUTPATIENT
Start: 2022-07-20 | End: 2022-07-20 | Stop reason: SDUPTHER

## 2022-07-20 RX ORDER — LORAZEPAM 1 MG/1
1 TABLET ORAL EVERY 12 HOURS PRN
Status: DISCONTINUED | OUTPATIENT
Start: 2022-07-20 | End: 2022-07-20 | Stop reason: SDUPTHER

## 2022-07-20 RX ORDER — ENOXAPARIN SODIUM 100 MG/ML
40 INJECTION SUBCUTANEOUS ONCE
Status: DISCONTINUED | OUTPATIENT
Start: 2022-07-21 | End: 2022-07-20 | Stop reason: SDUPTHER

## 2022-07-20 RX ORDER — GABAPENTIN 250 MG/5ML
300 SOLUTION ORAL EVERY 8 HOURS
Status: DISCONTINUED | OUTPATIENT
Start: 2022-07-20 | End: 2022-07-21 | Stop reason: HOSPADM

## 2022-07-20 RX ORDER — SODIUM CHLORIDE, SODIUM LACTATE, POTASSIUM CHLORIDE, CALCIUM CHLORIDE 600; 310; 30; 20 MG/100ML; MG/100ML; MG/100ML; MG/100ML
100 INJECTION, SOLUTION INTRAVENOUS CONTINUOUS
Status: DISCONTINUED | OUTPATIENT
Start: 2022-07-20 | End: 2022-07-20

## 2022-07-20 RX ORDER — CEFAZOLIN SODIUM IN 0.9 % NACL 3 G/100 ML
3 INTRAVENOUS SOLUTION, PIGGYBACK (ML) INTRAVENOUS
Status: COMPLETED | OUTPATIENT
Start: 2022-07-20 | End: 2022-07-20

## 2022-07-20 RX ORDER — CYANOCOBALAMIN 1000 UG/ML
1000 INJECTION, SOLUTION INTRAMUSCULAR; SUBCUTANEOUS ONCE
Status: DISCONTINUED | OUTPATIENT
Start: 2022-07-21 | End: 2022-07-20 | Stop reason: SDUPTHER

## 2022-07-20 RX ORDER — PROMETHAZINE HYDROCHLORIDE 12.5 MG/1
12.5 TABLET ORAL EVERY 4 HOURS PRN
Status: DISCONTINUED | OUTPATIENT
Start: 2022-07-20 | End: 2022-07-20 | Stop reason: SDUPTHER

## 2022-07-20 RX ORDER — PROCHLORPERAZINE EDISYLATE 5 MG/ML
10 INJECTION INTRAMUSCULAR; INTRAVENOUS EVERY 6 HOURS PRN
Status: DISCONTINUED | OUTPATIENT
Start: 2022-07-20 | End: 2022-07-21 | Stop reason: HOSPADM

## 2022-07-20 RX ORDER — HYDROMORPHONE HYDROCHLORIDE 1 MG/ML
0.5 INJECTION, SOLUTION INTRAMUSCULAR; INTRAVENOUS; SUBCUTANEOUS
Status: DISCONTINUED | OUTPATIENT
Start: 2022-07-20 | End: 2022-07-21 | Stop reason: HOSPADM

## 2022-07-20 RX ORDER — GABAPENTIN 300 MG/1
300 CAPSULE ORAL EVERY 8 HOURS
Status: DISCONTINUED | OUTPATIENT
Start: 2022-07-20 | End: 2022-07-21 | Stop reason: HOSPADM

## 2022-07-20 RX ORDER — ONDANSETRON 2 MG/ML
4 INJECTION INTRAMUSCULAR; INTRAVENOUS EVERY 4 HOURS PRN
Status: DISCONTINUED | OUTPATIENT
Start: 2022-07-20 | End: 2022-07-20 | Stop reason: SDUPTHER

## 2022-07-20 RX ORDER — METOCLOPRAMIDE HYDROCHLORIDE 5 MG/ML
10 INJECTION INTRAMUSCULAR; INTRAVENOUS ONCE
Status: COMPLETED | OUTPATIENT
Start: 2022-07-20 | End: 2022-07-20

## 2022-07-20 RX ORDER — ENOXAPARIN SODIUM 100 MG/ML
40 INJECTION SUBCUTANEOUS ONCE
Status: COMPLETED | OUTPATIENT
Start: 2022-07-21 | End: 2022-07-21

## 2022-07-20 RX ORDER — PROMETHAZINE HYDROCHLORIDE 25 MG/1
12.5 TABLET ORAL ONCE AS NEEDED
Status: DISCONTINUED | OUTPATIENT
Start: 2022-07-20 | End: 2022-07-20 | Stop reason: HOSPADM

## 2022-07-20 RX ORDER — SODIUM CHLORIDE, SODIUM LACTATE, POTASSIUM CHLORIDE, CALCIUM CHLORIDE 600; 310; 30; 20 MG/100ML; MG/100ML; MG/100ML; MG/100ML
9 INJECTION, SOLUTION INTRAVENOUS CONTINUOUS
Status: DISCONTINUED | OUTPATIENT
Start: 2022-07-20 | End: 2022-07-20 | Stop reason: SDUPTHER

## 2022-07-20 RX ORDER — HYDROMORPHONE HYDROCHLORIDE 2 MG/1
2 TABLET ORAL EVERY 4 HOURS PRN
Status: DISCONTINUED | OUTPATIENT
Start: 2022-07-20 | End: 2022-07-20 | Stop reason: SDUPTHER

## 2022-07-20 RX ORDER — FAMOTIDINE 10 MG/ML
20 INJECTION, SOLUTION INTRAVENOUS EVERY 12 HOURS SCHEDULED
Status: DISCONTINUED | OUTPATIENT
Start: 2022-07-20 | End: 2022-07-20 | Stop reason: SDUPTHER

## 2022-07-20 RX ORDER — NALOXONE HCL 0.4 MG/ML
0.2 VIAL (ML) INJECTION AS NEEDED
Status: DISCONTINUED | OUTPATIENT
Start: 2022-07-20 | End: 2022-07-20 | Stop reason: HOSPADM

## 2022-07-20 RX ORDER — SODIUM CHLORIDE 0.9 % (FLUSH) 0.9 %
3 SYRINGE (ML) INJECTION EVERY 12 HOURS SCHEDULED
Status: DISCONTINUED | OUTPATIENT
Start: 2022-07-20 | End: 2022-07-20 | Stop reason: HOSPADM

## 2022-07-20 RX ORDER — ONDANSETRON 2 MG/ML
4 INJECTION INTRAMUSCULAR; INTRAVENOUS ONCE AS NEEDED
Status: COMPLETED | OUTPATIENT
Start: 2022-07-20 | End: 2022-07-20

## 2022-07-20 RX ORDER — HYDRALAZINE HYDROCHLORIDE 20 MG/ML
10 INJECTION INTRAMUSCULAR; INTRAVENOUS
Status: DISCONTINUED | OUTPATIENT
Start: 2022-07-20 | End: 2022-07-21 | Stop reason: HOSPADM

## 2022-07-20 RX ORDER — METOCLOPRAMIDE HYDROCHLORIDE 5 MG/ML
10 INJECTION INTRAMUSCULAR; INTRAVENOUS EVERY 6 HOURS
Status: DISCONTINUED | OUTPATIENT
Start: 2022-07-20 | End: 2022-07-21 | Stop reason: HOSPADM

## 2022-07-20 RX ORDER — MIDAZOLAM HYDROCHLORIDE 1 MG/ML
1 INJECTION INTRAMUSCULAR; INTRAVENOUS
Status: DISCONTINUED | OUTPATIENT
Start: 2022-07-20 | End: 2022-07-20 | Stop reason: HOSPADM

## 2022-07-20 RX ORDER — PROMETHAZINE HYDROCHLORIDE 12.5 MG/1
12.5 TABLET ORAL EVERY 4 HOURS PRN
Status: DISCONTINUED | OUTPATIENT
Start: 2022-07-20 | End: 2022-07-21 | Stop reason: HOSPADM

## 2022-07-20 RX ORDER — MIRTAZAPINE 15 MG/1
15 TABLET, ORALLY DISINTEGRATING ORAL NIGHTLY
Status: DISCONTINUED | OUTPATIENT
Start: 2022-07-20 | End: 2022-07-20 | Stop reason: SDUPTHER

## 2022-07-20 RX ORDER — MAGNESIUM HYDROXIDE 1200 MG/15ML
LIQUID ORAL AS NEEDED
Status: DISCONTINUED | OUTPATIENT
Start: 2022-07-20 | End: 2022-07-20 | Stop reason: HOSPADM

## 2022-07-20 RX ADMIN — ONDANSETRON 4 MG: 2 INJECTION INTRAMUSCULAR; INTRAVENOUS at 20:44

## 2022-07-20 RX ADMIN — FENTANYL CITRATE 50 MCG: 50 INJECTION INTRAMUSCULAR; INTRAVENOUS at 14:27

## 2022-07-20 RX ADMIN — CHLORHEXIDINE GLUCONATE 15 ML: 1.2 SOLUTION ORAL at 07:03

## 2022-07-20 RX ADMIN — GABAPENTIN 300 MG: 300 CAPSULE ORAL at 18:12

## 2022-07-20 RX ADMIN — FENTANYL CITRATE 50 MCG: 50 INJECTION INTRAMUSCULAR; INTRAVENOUS at 09:28

## 2022-07-20 RX ADMIN — LIDOCAINE HYDROCHLORIDE 100 MG: 20 INJECTION, SOLUTION INFILTRATION; PERINEURAL at 08:25

## 2022-07-20 RX ADMIN — ACETAMINOPHEN 1000 MG: 500 TABLET ORAL at 18:12

## 2022-07-20 RX ADMIN — MIRTAZAPINE 15 MG: 15 TABLET, ORALLY DISINTEGRATING ORAL at 20:29

## 2022-07-20 RX ADMIN — METOCLOPRAMIDE HYDROCHLORIDE 10 MG: 5 INJECTION INTRAMUSCULAR; INTRAVENOUS at 23:04

## 2022-07-20 RX ADMIN — MAGNESIUM SULFATE HEPTAHYDRATE 2 G: 500 INJECTION, SOLUTION INTRAMUSCULAR; INTRAVENOUS at 08:35

## 2022-07-20 RX ADMIN — DEXAMETHASONE SODIUM PHOSPHATE 10 MG: 10 INJECTION INTRAMUSCULAR; INTRAVENOUS at 08:38

## 2022-07-20 RX ADMIN — ONDANSETRON 4 MG: 2 INJECTION INTRAMUSCULAR; INTRAVENOUS at 09:11

## 2022-07-20 RX ADMIN — CEFAZOLIN SODIUM 3 G: 10 INJECTION, POWDER, FOR SOLUTION INTRAVENOUS at 08:05

## 2022-07-20 RX ADMIN — METOCLOPRAMIDE HYDROCHLORIDE 10 MG: 5 INJECTION INTRAMUSCULAR; INTRAVENOUS at 07:03

## 2022-07-20 RX ADMIN — METOCLOPRAMIDE HYDROCHLORIDE 10 MG: 5 INJECTION INTRAMUSCULAR; INTRAVENOUS at 18:12

## 2022-07-20 RX ADMIN — AMISULPRIDE 10 MG: 2.5 INJECTION, SOLUTION INTRAVENOUS at 10:44

## 2022-07-20 RX ADMIN — ACETAMINOPHEN 1000 MG: 500 TABLET ORAL at 23:04

## 2022-07-20 RX ADMIN — KETAMINE HYDROCHLORIDE 20 MG: 10 INJECTION INTRAMUSCULAR; INTRAVENOUS at 08:25

## 2022-07-20 RX ADMIN — GABAPENTIN 300 MG: 250 SOLUTION ORAL at 06:50

## 2022-07-20 RX ADMIN — FENTANYL CITRATE 50 MCG: 50 INJECTION INTRAMUSCULAR; INTRAVENOUS at 08:25

## 2022-07-20 RX ADMIN — SUGAMMADEX 200 MG: 100 INJECTION, SOLUTION INTRAVENOUS at 09:15

## 2022-07-20 RX ADMIN — FAMOTIDINE 20 MG: 10 INJECTION INTRAVENOUS at 20:29

## 2022-07-20 RX ADMIN — ONDANSETRON 4 MG: 2 INJECTION INTRAMUSCULAR; INTRAVENOUS at 11:30

## 2022-07-20 RX ADMIN — FOLIC ACID 250 ML/HR: 5 INJECTION, SOLUTION INTRAMUSCULAR; INTRAVENOUS; SUBCUTANEOUS at 23:07

## 2022-07-20 RX ADMIN — HYDROMORPHONE HYDROCHLORIDE 0.5 MG: 1 INJECTION, SOLUTION INTRAMUSCULAR; INTRAVENOUS; SUBCUTANEOUS at 09:40

## 2022-07-20 RX ADMIN — SCOPALAMINE 1 PATCH: 1 PATCH, EXTENDED RELEASE TRANSDERMAL at 06:51

## 2022-07-20 RX ADMIN — FENTANYL CITRATE 50 MCG: 50 INJECTION INTRAMUSCULAR; INTRAVENOUS at 09:18

## 2022-07-20 RX ADMIN — ROCURONIUM BROMIDE 50 MG: 50 INJECTION INTRAVENOUS at 08:26

## 2022-07-20 RX ADMIN — SODIUM CHLORIDE, POTASSIUM CHLORIDE, SODIUM LACTATE AND CALCIUM CHLORIDE 500 ML: 600; 310; 30; 20 INJECTION, SOLUTION INTRAVENOUS at 06:50

## 2022-07-20 RX ADMIN — PANTOPRAZOLE SODIUM 40 MG: 40 INJECTION, POWDER, FOR SOLUTION INTRAVENOUS at 07:03

## 2022-07-20 RX ADMIN — HYDROCODONE BITARTRATE AND ACETAMINOPHEN 1 TABLET: 7.5; 325 TABLET ORAL at 14:28

## 2022-07-20 RX ADMIN — SODIUM CHLORIDE, POTASSIUM CHLORIDE, SODIUM LACTATE AND CALCIUM CHLORIDE 150 ML/HR: 600; 310; 30; 20 INJECTION, SOLUTION INTRAVENOUS at 20:29

## 2022-07-20 RX ADMIN — ACETAMINOPHEN 1000 MG: 10 INJECTION, SOLUTION INTRAVENOUS at 09:05

## 2022-07-20 RX ADMIN — FENTANYL CITRATE 50 MCG: 50 INJECTION INTRAMUSCULAR; INTRAVENOUS at 08:38

## 2022-07-20 RX ADMIN — THIAMINE HYDROCHLORIDE 100 MG: 100 INJECTION, SOLUTION INTRAMUSCULAR; INTRAVENOUS at 10:15

## 2022-07-20 RX ADMIN — PROPOFOL 150 MG: 10 INJECTION, EMULSION INTRAVENOUS at 08:25

## 2022-07-20 RX ADMIN — PROPOFOL 150 MCG/KG/MIN: 10 INJECTION, EMULSION INTRAVENOUS at 08:28

## 2022-07-20 NOTE — ANESTHESIA PROCEDURE NOTES
Airway  Urgency: elective    Date/Time: 7/20/2022 8:28 AM  Airway not difficult    General Information and Staff    Patient location during procedure: OR  CRNA/CAA: Anayeli Valera CRNA    Indications and Patient Condition    Preoxygenated: yes  Mask difficulty assessment: 1 - vent by mask    Final Airway Details  Final airway type: endotracheal airway      Successful airway: ETT  Cuffed: yes   Successful intubation technique: direct laryngoscopy  Endotracheal tube insertion site: oral  Blade: Razia  Blade size: 3  ETT size (mm): 7.5  Cormack-Lehane Classification: grade I - full view of glottis  Placement verified by: chest auscultation and capnometry   Measured from: teeth  ETT/EBT  to teeth (cm): 21  Number of attempts at approach: 1  Assessment: lips, teeth, and gum same as pre-op and atraumatic intubation    Additional Comments  Teeth, tongue, lips, and gums in preop condition. VSS throughout. Easy mask/easy airway.

## 2022-07-20 NOTE — ANESTHESIA PREPROCEDURE EVALUATION
Anesthesia Evaluation     Patient summary reviewed and Nursing notes reviewed                Airway   Mallampati: III  TM distance: >3 FB  Neck ROM: full  Possible difficult intubation  Dental - normal exam     Pulmonary - normal exam   Cardiovascular - normal exam        Neuro/Psych  (+) psychiatric history Anxiety,    GI/Hepatic/Renal/Endo    (+) obesity, morbid obesity,      Musculoskeletal     Abdominal   (+) obese,    Substance History      OB/GYN          Other                          Anesthesia Plan    ASA 3     general     intravenous induction     Anesthetic plan, risks, benefits, and alternatives have been provided, discussed and informed consent has been obtained with: patient.    Plan discussed with CRNA.        CODE STATUS:

## 2022-07-20 NOTE — ANESTHESIA POSTPROCEDURE EVALUATION
Patient: Randi Martell    Procedure Summary     Date: 07/20/22 Room / Location:  [facility] [address] [place] [address] [facility]    Anesthesia Start: 0818 Anesthesia Stop: 0931    Procedure: GASTRIC SLEEVE LAPAROSCOPIC  with robot assist   (N/A Abdomen) Diagnosis:       Obesity, Class III, BMI 40-49.9 (morbid obesity) (HCC)      (Obesity, Class III, BMI 40-49.9 (morbid obesity) (HCC) [E66.01])    Surgeons: Kiran Staples Jr., MD Provider: Román Collins MD    Anesthesia Type: general ASA Status: 3          Anesthesia Type: general    Vitals  Vitals Value Taken Time   /94 07/20/22 1200   Temp 36.6 °C (97.9 °F) 07/20/22 1200   Pulse 77 07/20/22 1214   Resp 18 07/20/22 1200   SpO2 98 % 07/20/22 1213   Vitals shown include unvalidated device data.        Post Anesthesia Care and Evaluation    Patient location during evaluation: PHASE II  Patient participation: complete - patient participated  Level of consciousness: awake  Pain management: adequate    Airway patency: patent  Anesthetic complications: No anesthetic complications    Cardiovascular status: acceptable  Respiratory status: acceptable  Hydration status: acceptable    Comments: /79 (BP Location: Left arm, Patient Position: Sitting)   Pulse 66   Temp 36.6 °C (97.9 °F) (Oral)   Resp 16   Wt 128 kg (281 lb 15.5 oz)   LMP 07/01/2022 (Approximate)   SpO2 100%   BMI 42.87 kg/m²

## 2022-07-21 ENCOUNTER — READMISSION MANAGEMENT (OUTPATIENT)
Dept: CALL CENTER | Facility: HOSPITAL | Age: 27
End: 2022-07-21

## 2022-07-21 VITALS
WEIGHT: 285.27 LBS | DIASTOLIC BLOOD PRESSURE: 80 MMHG | OXYGEN SATURATION: 100 % | HEART RATE: 58 BPM | TEMPERATURE: 97.8 F | BODY MASS INDEX: 43.38 KG/M2 | SYSTOLIC BLOOD PRESSURE: 120 MMHG | RESPIRATION RATE: 18 BRPM

## 2022-07-21 LAB
ALBUMIN SERPL-MCNC: 3.5 G/DL (ref 3.5–5.2)
ALBUMIN/GLOB SERPL: 1.2 G/DL
ALP SERPL-CCNC: 53 U/L (ref 39–117)
ALT SERPL W P-5'-P-CCNC: 8 U/L (ref 1–33)
ANION GAP SERPL CALCULATED.3IONS-SCNC: 11.5 MMOL/L (ref 5–15)
AST SERPL-CCNC: 11 U/L (ref 1–32)
BASOPHILS # BLD AUTO: 0.02 10*3/MM3 (ref 0–0.2)
BASOPHILS NFR BLD AUTO: 0.2 % (ref 0–1.5)
BILIRUB SERPL-MCNC: 0.4 MG/DL (ref 0–1.2)
BUN SERPL-MCNC: 7 MG/DL (ref 6–20)
BUN/CREAT SERPL: 13 (ref 7–25)
CALCIUM SPEC-SCNC: 8.1 MG/DL (ref 8.6–10.5)
CHLORIDE SERPL-SCNC: 106 MMOL/L (ref 98–107)
CO2 SERPL-SCNC: 20.5 MMOL/L (ref 22–29)
CREAT SERPL-MCNC: 0.54 MG/DL (ref 0.57–1)
DEPRECATED RDW RBC AUTO: 45.4 FL (ref 37–54)
EGFRCR SERPLBLD CKD-EPI 2021: 128.8 ML/MIN/1.73
EOSINOPHIL # BLD AUTO: 0.01 10*3/MM3 (ref 0–0.4)
EOSINOPHIL NFR BLD AUTO: 0.1 % (ref 0.3–6.2)
ERYTHROCYTE [DISTWIDTH] IN BLOOD BY AUTOMATED COUNT: 16.6 % (ref 12.3–15.4)
GLOBULIN UR ELPH-MCNC: 2.9 GM/DL
GLUCOSE SERPL-MCNC: 78 MG/DL (ref 65–99)
HCT VFR BLD AUTO: 32.1 % (ref 34–46.6)
HGB BLD-MCNC: 10.1 G/DL (ref 12–15.9)
IMM GRANULOCYTES # BLD AUTO: 0.05 10*3/MM3 (ref 0–0.05)
IMM GRANULOCYTES NFR BLD AUTO: 0.4 % (ref 0–0.5)
LAB AP CASE REPORT: NORMAL
LYMPHOCYTES # BLD AUTO: 1.89 10*3/MM3 (ref 0.7–3.1)
LYMPHOCYTES NFR BLD AUTO: 16.6 % (ref 19.6–45.3)
MAGNESIUM SERPL-MCNC: 1.8 MG/DL (ref 1.6–2.6)
MCH RBC QN AUTO: 24 PG (ref 26.6–33)
MCHC RBC AUTO-ENTMCNC: 31.5 G/DL (ref 31.5–35.7)
MCV RBC AUTO: 76.4 FL (ref 79–97)
MONOCYTES # BLD AUTO: 0.86 10*3/MM3 (ref 0.1–0.9)
MONOCYTES NFR BLD AUTO: 7.6 % (ref 5–12)
NEUTROPHILS NFR BLD AUTO: 75.1 % (ref 42.7–76)
NEUTROPHILS NFR BLD AUTO: 8.53 10*3/MM3 (ref 1.7–7)
NRBC BLD AUTO-RTO: 0 /100 WBC (ref 0–0.2)
PATH REPORT.FINAL DX SPEC: NORMAL
PATH REPORT.GROSS SPEC: NORMAL
PHOSPHATE SERPL-MCNC: 2.4 MG/DL (ref 2.5–4.5)
PLATELET # BLD AUTO: 299 10*3/MM3 (ref 140–450)
PMV BLD AUTO: 11.2 FL (ref 6–12)
POTASSIUM SERPL-SCNC: 4.3 MMOL/L (ref 3.5–5.2)
PROT SERPL-MCNC: 6.4 G/DL (ref 6–8.5)
RBC # BLD AUTO: 4.2 10*6/MM3 (ref 3.77–5.28)
SODIUM SERPL-SCNC: 138 MMOL/L (ref 136–145)
WBC NRBC COR # BLD: 11.36 10*3/MM3 (ref 3.4–10.8)

## 2022-07-21 PROCEDURE — 84100 ASSAY OF PHOSPHORUS: CPT | Performed by: SURGERY

## 2022-07-21 PROCEDURE — 85025 COMPLETE CBC W/AUTO DIFF WBC: CPT | Performed by: SURGERY

## 2022-07-21 PROCEDURE — 83735 ASSAY OF MAGNESIUM: CPT | Performed by: SURGERY

## 2022-07-21 PROCEDURE — 25010000002 ENOXAPARIN PER 10 MG: Performed by: SURGERY

## 2022-07-21 PROCEDURE — 80053 COMPREHEN METABOLIC PANEL: CPT | Performed by: SURGERY

## 2022-07-21 PROCEDURE — 25010000002 CYANOCOBALAMIN PER 1000 MCG: Performed by: SURGERY

## 2022-07-21 PROCEDURE — 25010000002 METOCLOPRAMIDE PER 10 MG: Performed by: SURGERY

## 2022-07-21 PROCEDURE — 25010000002 ONDANSETRON PER 1 MG: Performed by: SURGERY

## 2022-07-21 PROCEDURE — 25010000002 THIAMINE PER 100 MG: Performed by: SURGERY

## 2022-07-21 RX ORDER — ENOXAPARIN SODIUM 100 MG/ML
40 INJECTION SUBCUTANEOUS
Qty: 5.6 ML | Refills: 0 | COMMUNITY
Start: 2022-07-21 | End: 2022-08-04

## 2022-07-21 RX ORDER — ENOXAPARIN SODIUM 100 MG/ML
40 INJECTION SUBCUTANEOUS
Qty: 5.6 ML | Refills: 0 | Status: SHIPPED | OUTPATIENT
Start: 2022-07-21 | End: 2022-07-21 | Stop reason: SDUPTHER

## 2022-07-21 RX ADMIN — HYDROMORPHONE HYDROCHLORIDE 2 MG: 2 TABLET ORAL at 05:30

## 2022-07-21 RX ADMIN — METOCLOPRAMIDE HYDROCHLORIDE 10 MG: 5 INJECTION INTRAMUSCULAR; INTRAVENOUS at 05:05

## 2022-07-21 RX ADMIN — CYANOCOBALAMIN 1000 MCG: 1000 INJECTION, SOLUTION INTRAMUSCULAR; SUBCUTANEOUS at 09:15

## 2022-07-21 RX ADMIN — ENOXAPARIN SODIUM 40 MG: 100 INJECTION SUBCUTANEOUS at 09:16

## 2022-07-21 RX ADMIN — Medication 3 ML: at 09:17

## 2022-07-21 RX ADMIN — GABAPENTIN 300 MG: 300 CAPSULE ORAL at 09:16

## 2022-07-21 RX ADMIN — ACETAMINOPHEN 1000 MG: 500 TABLET ORAL at 09:16

## 2022-07-21 RX ADMIN — FAMOTIDINE 20 MG: 10 INJECTION INTRAVENOUS at 09:15

## 2022-07-21 RX ADMIN — ONDANSETRON 4 MG: 2 INJECTION INTRAMUSCULAR; INTRAVENOUS at 09:15

## 2022-07-21 RX ADMIN — GABAPENTIN 300 MG: 300 CAPSULE ORAL at 02:36

## 2022-07-21 RX ADMIN — SODIUM CHLORIDE, POTASSIUM CHLORIDE, SODIUM LACTATE AND CALCIUM CHLORIDE 150 ML/HR: 600; 310; 30; 20 INJECTION, SOLUTION INTRAVENOUS at 03:08

## 2022-07-21 NOTE — OUTREACH NOTE
Prep Survey    Flowsheet Row Responses   [facility] facility patient discharged from? [place]   Is LACE score < 7 ? Yes   Emergency Room discharge w/ pulse ox? No   Eligibility [facility]   Date of Admission 07/20/22   Date of Discharge 07/21/22   Discharge Disposition Home or Self Care   Discharge diagnosis Gastric lap sleeve   Does the patient have one of the following disease processes/diagnoses(primary or secondary)? General Surgery   Does the patient have Home health ordered? No   Is there a DME ordered? No   Prep survey completed? Yes          KATARZYNA JUSTICE - Registered Nurse

## 2022-07-22 ENCOUNTER — TRANSITIONAL CARE MANAGEMENT TELEPHONE ENCOUNTER (OUTPATIENT)
Dept: CALL CENTER | Facility: HOSPITAL | Age: 27
End: 2022-07-22

## 2022-07-22 NOTE — OUTREACH NOTE
Call Center TCM Note    Flowsheet Row Responses   [facility] patient discharged from? [place]   Does the patient have one of the following disease processes/diagnoses(primary or secondary)? General Surgery   TCM attempt successful? No   Unsuccessful attempts Attempt 2          Kailash Hardy RN    7/22/2022, 15:05 EDT

## 2022-07-22 NOTE — OUTREACH NOTE
Call Center TCM Note    Flowsheet Row Responses   [facility] patient discharged from? [place]   Does the patient have one of the following disease processes/diagnoses(primary or secondary)? General Surgery   TCM attempt successful? No   Unsuccessful attempts Attempt 1          Kailash Hardy RN    7/22/2022, 14:55 EDT

## 2022-07-25 ENCOUNTER — TRANSITIONAL CARE MANAGEMENT TELEPHONE ENCOUNTER (OUTPATIENT)
Dept: CALL CENTER | Facility: HOSPITAL | Age: 27
End: 2022-07-25

## 2022-07-25 NOTE — OUTREACH NOTE
Call Center TCM Note    Flowsheet Row Responses   [facility] patient discharged from? [place]   Does the patient have one of the following disease processes/diagnoses(primary or secondary)? General Surgery   TCM attempt successful? No   Unsuccessful attempts Attempt 3          Debra Nettles LPN    7/25/2022, 08:35 EDT

## 2022-07-26 ENCOUNTER — TELEPHONE (OUTPATIENT)
Dept: BARIATRICS/WEIGHT MGMT | Facility: CLINIC | Age: 27
End: 2022-07-26

## 2022-09-06 DIAGNOSIS — Z00.00 ANNUAL PHYSICAL EXAM: Primary | ICD-10-CM

## 2024-10-24 ENCOUNTER — OFFICE VISIT (OUTPATIENT)
Dept: FAMILY MEDICINE CLINIC | Age: 29
End: 2024-10-24
Payer: MEDICAID

## 2024-10-24 VITALS
DIASTOLIC BLOOD PRESSURE: 76 MMHG | OXYGEN SATURATION: 100 % | HEART RATE: 77 BPM | HEIGHT: 68 IN | TEMPERATURE: 98.2 F | SYSTOLIC BLOOD PRESSURE: 103 MMHG | BODY MASS INDEX: 42.25 KG/M2 | WEIGHT: 278.8 LBS

## 2024-10-24 DIAGNOSIS — Z13.1 SCREENING FOR DIABETES MELLITUS (DM): ICD-10-CM

## 2024-10-24 DIAGNOSIS — D36.9 DERMOID CYST: ICD-10-CM

## 2024-10-24 DIAGNOSIS — L03.221 CELLULITIS OF NECK: Primary | ICD-10-CM

## 2024-10-24 DIAGNOSIS — D50.9 IRON DEFICIENCY ANEMIA, UNSPECIFIED IRON DEFICIENCY ANEMIA TYPE: ICD-10-CM

## 2024-10-24 DIAGNOSIS — Z13.220 LIPID SCREENING: ICD-10-CM

## 2024-10-24 PROCEDURE — 1159F MED LIST DOCD IN RCRD: CPT | Performed by: INTERNAL MEDICINE

## 2024-10-24 PROCEDURE — 99214 OFFICE O/P EST MOD 30 MIN: CPT | Performed by: INTERNAL MEDICINE

## 2024-10-24 PROCEDURE — 1160F RVW MEDS BY RX/DR IN RCRD: CPT | Performed by: INTERNAL MEDICINE

## 2024-10-24 RX ORDER — ONDANSETRON 8 MG/1
TABLET, FILM COATED ORAL
COMMUNITY
Start: 2024-09-03 | End: 2024-10-24

## 2024-10-24 RX ORDER — OXYCODONE AND ACETAMINOPHEN 5; 325 MG/1; MG/1
TABLET ORAL
COMMUNITY
Start: 2024-09-04 | End: 2024-10-24

## 2024-10-24 RX ORDER — PROMETHAZINE HYDROCHLORIDE 25 MG/1
TABLET ORAL
COMMUNITY
Start: 2024-09-04 | End: 2024-10-24

## 2024-10-24 RX ORDER — AMOXICILLIN 875 MG
875 TABLET ORAL 2 TIMES DAILY
Qty: 14 TABLET | Refills: 0 | Status: SHIPPED | OUTPATIENT
Start: 2024-10-24 | End: 2024-10-31

## 2024-10-24 RX ORDER — IBUPROFEN 600 MG/1
600 TABLET ORAL EVERY 8 HOURS PRN
COMMUNITY
Start: 2024-09-03 | End: 2024-10-24

## 2024-10-24 NOTE — PROGRESS NOTES
"Chief Complaint  Cyst (Lt side of neck; Pt wanting cyst drained )    Subjective      Randi Martell is a 29 y.o. female who presents to [facility]     Patient Care Team:  Hima Mcleod MD as PCP - General (Internal Medicine)  Ms. Severs presents today with complaints of a cyst on the left side of her neck and wanting it drained.  She is currently not established therefore she also needs to do this.  Past medical history is significant for menorrhagia and anemia.  She reports that the cyst on her neck was present for 7 years however over the last week it is enlarged and has become more red and swollen.  She would like this evaluated for removal today.  Review of Systems  Full review of systems obtained and pertinent positives states in above HPI.  Otherwise all others reviewed and are negative.    Objective   Vital Signs:   Vitals:    10/24/24 1422   BP: 103/76   BP Location: Left arm   Patient Position: Sitting   Cuff Size: Adult   Pulse: 77   Temp: 98.2 °F (36.8 °C)   TempSrc: Oral   SpO2: 100%   Weight: 126 kg (278 lb 12.8 oz)   Height: 172.7 cm (67.99\")     Body mass index is 42.4 kg/m².    Wt Readings from Last 3 Encounters:   10/24/24 126 kg (278 lb 12.8 oz)   07/21/22 129 kg (285 lb 4.4 oz)   06/30/22 134 kg (295 lb)     BP Readings from Last 3 Encounters:   10/24/24 103/76   07/21/22 120/80   07/18/22 109/78       Health Maintenance   Topic Date Due    ANNUAL PHYSICAL  Never done    PAP SMEAR  11/09/2023    INFLUENZA VACCINE  Never done    COVID-19 Vaccine (1 - 2023-24 season) Never done    TDAP/TD VACCINES (2 - Td or Tdap) 04/16/2031    HEPATITIS C SCREENING  Completed    Pneumococcal Vaccine 0-64  Aged Out       Physical Exam   GEN:NAD, well nourished  HEENT:NCAT, PERRL, EOMI, OP clear, MMM  NECK:supple, no JVD, no carotid bruits  CVA:RRR s1, s2 no m/r/g  CHEST:CTA B no wheezes or rhonchi  ABD:soft, nontender, nondistended +bs  EXT:no c/c/e 2+PP B  NEURO:CN " II-XII grossly nonfocal, no evidence of asterixes or tremor  PSYCH: appropriate mood and affect  :deferred  SKIN: warm, dry, intact, left neck with circumferential cyst measuring 5 cm with the skin over it revealing erythema, mild tenderness to palpation    Result Review   The following data was reviewed by: Hima Mcleod MD on 10/24/2024:  [x]  Tests & Results  []  Hospitalization/Emergency Department/Urgent Care  []  Internal/External Consultant Notes    Procedures          ASSESSMENT/PLAN  Diagnoses and all orders for this visit:    1. Cellulitis of neck (Primary)  Comments:  Amoxicillin 875 twice daily for 7 days    2. Dermoid cyst  Comments:  Refer to general surgery for evaluation and removal after infection has resolved.  Orders:  -     Ambulatory Referral to General Surgery    3. Iron deficiency anemia, unspecified iron deficiency anemia type  Comments:  Will check a CBC as well as an iron profile and iron saturation  Orders:  -     CBC (No Diff)  -     Iron and TIBC    4. Lipid screening  Comments:  Check a lipid panel  Orders:  -     Comprehensive Metabolic Panel  -     Lipid Panel    5. Screening for diabetes mellitus (DM)  Comments:  Check hemoglobin A1c  Orders:  -     Hemoglobin A1c    Other orders  -     amoxicillin (AMOXIL) 875 MG tablet; Take 1 tablet by mouth 2 (Two) Times a Day for 7 days.  Dispense: 14 tablet; Refill: 0        Class 3 Severe Obesity (BMI >=40). Obesity-related health conditions include the following: none. Obesity is improving with lifestyle modifications. BMI is is above average; BMI management plan is completed. We discussed portion control and increasing exercise.       Randi Martell  reports that she has never smoked. She has never used smokeless tobacco.              FOLLOW UP  No follow-ups on file.  Follow-up in 1 month for visit annual physical exam  Patient was given instructions and counseling regarding her condition or for health maintenance advice. Please  see specific information pulled into the AVS if appropriate.       iHma Mcleod MD  10/24/24  15:27 EDT

## 2024-11-08 ENCOUNTER — TELEPHONE (OUTPATIENT)
Dept: FAMILY MEDICINE CLINIC | Age: 29
End: 2024-11-08
Payer: MEDICAID

## 2024-11-08 NOTE — TELEPHONE ENCOUNTER
Spoke to patient regarding overdue labs ordered on 10-24-24. Patient will have drawn prior to next appt on 11-25-24. 1st attempt

## 2024-12-26 ENCOUNTER — TELEPHONE (OUTPATIENT)
Dept: BARIATRICS/WEIGHT MGMT | Facility: CLINIC | Age: 29
End: 2024-12-26
Payer: MEDICAID

## 2024-12-26 NOTE — TELEPHONE ENCOUNTER
Department of Anesthesiology  Postprocedure Note    Patient: Jessa River  MRN: [id number]  YOB: 1937  Date of evaluation: 5/17/2021  Time:  11:03 AM     Procedure Summary     Date: 05/17/21 Room / Location: [place] [id number] / [facility]    Anesthesia Start: 6128 Anesthesia Stop: 1008    Procedure: RIGHT TOTAL KNEE REPLACEMENT (Right Knee) Diagnosis: (RIGHT KNEE OSTEOARTHRITIS)    Surgeons: Kristi Altamirano MD Responsible Provider: Mayra Tan MD    Anesthesia Type: spinal, regional ASA Status: 3          Anesthesia Type: spinal, regional    Efrain Phase I: Efrain Score: 8    Efrain Phase II:      Last vitals: Reviewed and per EMR flowsheets.        Anesthesia Post Evaluation    Patient location during evaluation: PACU  Level of consciousness: awake  Airway patency: patent  Nausea & Vomiting: no nausea  Complications: no  Cardiovascular status: blood pressure returned to baseline  Respiratory status: acceptable  Hydration status: euvolemic Left voice message for patient to call back and schedule yearly bariatric follow up appointment.

## 2025-05-02 ENCOUNTER — TELEMEDICINE (OUTPATIENT)
Dept: BARIATRICS/WEIGHT MGMT | Facility: CLINIC | Age: 30
End: 2025-05-02
Payer: MEDICAID

## 2025-05-02 DIAGNOSIS — E66.01 OBESITY, CLASS III, BMI 40-49.9 (MORBID OBESITY): Primary | ICD-10-CM

## 2025-05-02 DIAGNOSIS — D50.9 IRON DEFICIENCY ANEMIA, UNSPECIFIED IRON DEFICIENCY ANEMIA TYPE: ICD-10-CM

## 2025-05-02 DIAGNOSIS — Z71.3 DIETARY COUNSELING: ICD-10-CM

## 2025-05-28 NOTE — PROGRESS NOTES
"Metabolic and Bariatric Surgery Nutrition Follow Up    Patient Name: Randi Martell    YOB: 1995   Age: 30 y.o.  Sex: female  MRN: [id number]     Patient presents today for telehealth service. This service was conducted via [employer] video visit. This provider is located at home. Patient is being seen remotely at home.    You have chosen to receive care through a telehealth visit. Do you consent to use a video/audio connection for your care today? Yes  The visit included audio and video interaction. No technical issues occurred during this visit     ASSESSMENT    Procedure Performed: laparoscopic sleeve gastrectomy   Date of Surgery: 7/20/2022    Anthropometrics  Estimated body mass index is 42.4 kg/m² as calculated from the following:    Height as of 10/24/24: 172.7 cm (67.99\").    Weight as of 10/24/24: 126 kg (278 lb 12.8 oz).    Surgery weight: 295 lbs  Weight change since surgery: - 17 lbs  Reason for visit:  re-establish care and get back on track with weight loss    Randi Martell is status post sleeve procedure and presents for nutrition follow up. She was not seen for follow up after her surgery. Patient reports they are tolerating diet well. Diet history reviewed and discussed with the patient. Weight loss/gains to date discussed with the patient. Patient reports she continues to eat similar foods as before surgery, just smaller portions, using a smaller plate. She does eat sweets less frequently because they upset her stomach.   Over the past couple of weeks she has been attempting to cut back on breads and pasta as well as stick to a calorie deficit of 1200 calories to restart weight loss. Working on increasing protein in the form of foods like chicken, tuna and cheese. She has been eating little snacks every 2 hours or so throughout the day, such as cheese and pepperoni. Unsure exactly what her protein goal should be.   She is drinking mostly water and eating 8-9 cups of ice " "daily. Occasionally drinking lemonade or sweet tea when out to eat. Patient admits to struggling with \"food noise\" and eating when not hungry. She is not feeling satisfied and wants to snack constantly.   Taking iron supplement for anemia. Sleep patterns are fair, recently unchanged. She acknowledges stress and busy lifestyle impact food choices at times.   Exercise: using stepper machine and weighted hula hoop at home (recently started this)     Estimated Nutrition Requirements  BMR (using [place]-St. Jeor equation): 1897 calories per day   TDEE (using activity factor 1.2): 2276 calories per day   Daily protein needs: 90 grams per day     DIAGNOSIS    Nutrition problem statement: Obesity related to multifactorial biochemical, behavioral and environmental contributors to disease as evidenced by BMI 42.4 kg/m².    INTERVENTION    Nutrition education and coaching for behavior change completed. Educational materials provided.I reviewed the appropriate dietary choices with the patient and provided guidance and suggestions for making necessary changes. Discussed sustainable habit changes and setting small, achievable goals that can be maintained long term. Recommended focus on eating protein first, followed by vegetables/fruits/fiber rich foods. Limit or weigh/measure portions sizes for high fat and calorie dense foods. Discussed bariatric portion plate model for guidelines on putting together balanced meals. Use small plate and eat protein first, then vegetables, then starches. Suggested the option of keeping a food journal which will help patient become more aware of the nutritional value of food, establish starting point and identify areas for improvement. Aim for at least 64 oz water daily and wait 30 minutes after eating before drinking. Reviewed vitamin requirements. Be sure to do routine exercise, work up to 150 minutes per week, including both cardio and strength training. Offered follow up for support and " accountability.      MONITORING & EVALUATION    Goals/Plan:   Continue to measure portion sizes and track intake on paper or electronically.   Follow up with medical provider in office for evaluation and nutrition labs   Discussed changing nutrition needs after weight loss surgery. Recommend increase calorie and protein goal in order to feel satiated and control cravings. Focus on 3 balanced meals that incorporate protein, vegetables and complex carbohydrates/fiber. Add 1-2 snacks that are portion controlled and include protein source. Avoid snacking or grazing all day long. Practice eating mindfully, slowing down and limiting distractions.   Continue exercise, increasing duration and/or intensity as tolerated    Recommendations for weight loss:   1700 calories  170-191 gm carbohydrate (40-45%)  106-128 gm protein (25-30%)  57 gm fat (30%)    Follow-Up:  1 month    Total time spent during this encounter today exceeded 30 minutes and includes preparing for the visit, reviewing tests, counseling and educating the patient/family/caregiver and documenting information in the medical record.    Electronically signed by:  Rimma Fuentes RD

## 2025-06-11 ENCOUNTER — TELEPHONE (OUTPATIENT)
Dept: FAMILY MEDICINE CLINIC | Age: 30
End: 2025-06-11
Payer: MEDICAID

## 2025-06-11 NOTE — TELEPHONE ENCOUNTER
Pt was called due to the appt that was no showed on 06/06/2025. Pt chose not to reschedule due to having an appt scheduled with her PCP. She stated that she thought that the appt was scheduled for July.

## 2025-07-08 ENCOUNTER — LAB (OUTPATIENT)
Dept: LAB | Facility: HOSPITAL | Age: 30
End: 2025-07-08
Payer: MEDICAID

## 2025-07-08 ENCOUNTER — TELEPHONE (OUTPATIENT)
Dept: FAMILY MEDICINE CLINIC | Age: 30
End: 2025-07-08
Payer: MEDICAID

## 2025-07-08 LAB
ALBUMIN SERPL-MCNC: 3.8 G/DL (ref 3.5–5.2)
ALBUMIN/GLOB SERPL: 1.2 G/DL
ALP SERPL-CCNC: 61 U/L (ref 39–117)
ALT SERPL W P-5'-P-CCNC: 5 U/L (ref 1–33)
ANION GAP SERPL CALCULATED.3IONS-SCNC: 11 MMOL/L (ref 5–15)
AST SERPL-CCNC: 17 U/L (ref 1–32)
BILIRUB SERPL-MCNC: 0.4 MG/DL (ref 0–1.2)
BUN SERPL-MCNC: 11 MG/DL (ref 6–20)
BUN/CREAT SERPL: 14.5 (ref 7–25)
CALCIUM SPEC-SCNC: 8.7 MG/DL (ref 8.6–10.5)
CHLORIDE SERPL-SCNC: 104 MMOL/L (ref 98–107)
CHOLEST SERPL-MCNC: 141 MG/DL (ref 0–200)
CO2 SERPL-SCNC: 24 MMOL/L (ref 22–29)
CREAT SERPL-MCNC: 0.76 MG/DL (ref 0.57–1)
DEPRECATED RDW RBC AUTO: 49.1 FL (ref 37–54)
EGFRCR SERPLBLD CKD-EPI 2021: 107.6 ML/MIN/1.73
ERYTHROCYTE [DISTWIDTH] IN BLOOD BY AUTOMATED COUNT: 18.9 % (ref 12.3–15.4)
GLOBULIN UR ELPH-MCNC: 3.2 GM/DL
GLUCOSE SERPL-MCNC: 72 MG/DL (ref 65–99)
HCT VFR BLD AUTO: 29.2 % (ref 34–46.6)
HDLC SERPL-MCNC: 51 MG/DL (ref 40–60)
HGB BLD-MCNC: 8.2 G/DL (ref 12–15.9)
IRON 24H UR-MRATE: 13 MCG/DL (ref 37–145)
IRON SATN MFR SERPL: 3 % (ref 20–50)
LDLC SERPL CALC-MCNC: 79 MG/DL (ref 0–100)
LDLC/HDLC SERPL: 1.56 {RATIO}
MCH RBC QN AUTO: 20.2 PG (ref 26.6–33)
MCHC RBC AUTO-ENTMCNC: 28.1 G/DL (ref 31.5–35.7)
MCV RBC AUTO: 72.1 FL (ref 79–97)
PLATELET # BLD AUTO: 394 10*3/MM3 (ref 140–450)
PMV BLD AUTO: 10.9 FL (ref 6–12)
POTASSIUM SERPL-SCNC: 4.1 MMOL/L (ref 3.5–5.2)
PROT SERPL-MCNC: 7 G/DL (ref 6–8.5)
RBC # BLD AUTO: 4.05 10*6/MM3 (ref 3.77–5.28)
SODIUM SERPL-SCNC: 139 MMOL/L (ref 136–145)
TIBC SERPL-MCNC: 457 MCG/DL (ref 298–536)
TRANSFERRIN SERPL-MCNC: 307 MG/DL (ref 200–360)
TRIGL SERPL-MCNC: 53 MG/DL (ref 0–150)
VLDLC SERPL-MCNC: 11 MG/DL (ref 5–40)
WBC NRBC COR # BLD AUTO: 7.46 10*3/MM3 (ref 3.4–10.8)

## 2025-07-08 PROCEDURE — 83036 HEMOGLOBIN GLYCOSYLATED A1C: CPT | Performed by: INTERNAL MEDICINE

## 2025-07-08 PROCEDURE — 80061 LIPID PANEL: CPT | Performed by: INTERNAL MEDICINE

## 2025-07-08 PROCEDURE — 84466 ASSAY OF TRANSFERRIN: CPT | Performed by: INTERNAL MEDICINE

## 2025-07-08 PROCEDURE — 83540 ASSAY OF IRON: CPT | Performed by: INTERNAL MEDICINE

## 2025-07-08 PROCEDURE — 80053 COMPREHEN METABOLIC PANEL: CPT | Performed by: INTERNAL MEDICINE

## 2025-07-08 PROCEDURE — 85027 COMPLETE CBC AUTOMATED: CPT | Performed by: INTERNAL MEDICINE

## 2025-07-08 NOTE — TELEPHONE ENCOUNTER
Caller: Randi Martell    Relationship: Self    Best call back number: [phone]    What orders are you requesting (i.e. lab or imaging): BLOOD LAB ORDERS LOW IRON AND COMPLETE PANEL OF LABS    In what timeframe would the patient need to come in: TODAY    Where will you receive your lab/imaging services: [facility]    Additional notes: PATIENT WOULD LIKE TO COME IN TODAY IF POSSIBLE TO HAVE THESE LABS DRAWN. PLEASE CALL PATIENT WHEN ORDERS ARE PUT IN SYSTEM.

## 2025-07-09 LAB — HBA1C MFR BLD: 5 % (ref 4.8–5.6)

## 2025-07-14 ENCOUNTER — OFFICE VISIT (OUTPATIENT)
Dept: FAMILY MEDICINE CLINIC | Age: 30
End: 2025-07-14
Payer: MEDICAID

## 2025-07-14 VITALS
OXYGEN SATURATION: 96 % | HEART RATE: 74 BPM | TEMPERATURE: 98.6 F | DIASTOLIC BLOOD PRESSURE: 66 MMHG | HEIGHT: 68 IN | WEIGHT: 235.2 LBS | BODY MASS INDEX: 35.64 KG/M2 | SYSTOLIC BLOOD PRESSURE: 93 MMHG

## 2025-07-14 DIAGNOSIS — Z71.3 DIETARY COUNSELING: ICD-10-CM

## 2025-07-14 DIAGNOSIS — E66.812 CLASS 2 OBESITY WITHOUT SERIOUS COMORBIDITY WITH BODY MASS INDEX (BMI) OF 35.0 TO 35.9 IN ADULT, UNSPECIFIED OBESITY TYPE: ICD-10-CM

## 2025-07-14 DIAGNOSIS — D50.9 IRON DEFICIENCY ANEMIA, UNSPECIFIED IRON DEFICIENCY ANEMIA TYPE: ICD-10-CM

## 2025-07-14 DIAGNOSIS — F32.A ANXIETY AND DEPRESSION: Primary | ICD-10-CM

## 2025-07-14 DIAGNOSIS — F41.9 ANXIETY AND DEPRESSION: Primary | ICD-10-CM

## 2025-07-14 PROCEDURE — 1160F RVW MEDS BY RX/DR IN RCRD: CPT | Performed by: STUDENT IN AN ORGANIZED HEALTH CARE EDUCATION/TRAINING PROGRAM

## 2025-07-14 PROCEDURE — 99214 OFFICE O/P EST MOD 30 MIN: CPT | Performed by: STUDENT IN AN ORGANIZED HEALTH CARE EDUCATION/TRAINING PROGRAM

## 2025-07-14 PROCEDURE — 1159F MED LIST DOCD IN RCRD: CPT | Performed by: STUDENT IN AN ORGANIZED HEALTH CARE EDUCATION/TRAINING PROGRAM

## 2025-07-14 NOTE — PROGRESS NOTES
Chief Complaint     Depression, Results (Discuss recent lab work ), and Obesity (Discuss weight management)    History of Present Illness     Randi Martell is a 30 y.o. female who presents to [facility].     Patient or patient representative verbalized consent for the use of Ambient Listening during the visit with  CHEN Kinney for chart documentation. 2025  14:54 EDT      History of Present Illness  The patient is a 30-year-old female who presents for evaluation of iron deficiency, depression and anxiety, and weight management.    She reports that her recent blood work, conducted last week, indicated persistently low iron levels despite her ongoing use of iron supplements. She inquires about the possibility of an iron transfusion as a potential solution.    She has been experiencing symptoms of depression and anxiety for a considerable period. Her previous physician prescribed Wellbutrin, which exacerbated her anger issues, leading to its discontinuation. She is seeking an alternative treatment and has not yet tried Zoloft. Additionally, she suspects she may have ADHD, a condition that runs in her family, and is considering a referral to a psychiatrist for further evaluation.    In , she underwent gastric sleeve surgery but has since reached a weight loss plateau. This year, she has gained 10 pounds and is eager to regain control over her weight and resume her weight loss journey. She has an indoor stepper for exercise but finds it challenging to use due to her ongoing ramires with depression.    PAST SURGICAL HISTORY:  - Gastric sleeve surgery in     FAMILY HISTORY  ADHD runs in her family.         History      Past Medical History:   Diagnosis Date    Anemia     Anxiety     Depression     Jaw pain     ? TMJ    Migraine     Obesity        Past Surgical History:   Procedure Laterality Date    BARIATRIC SURGERY      Gastric Sleeve     SECTION  N/A 2021    Procedure:  SECTION PRIMARY;  Surgeon: Clarita Hermosillo MD;  Location: [facility] LABOR DELIVERY;  Service: Obstetrics/Gynecology;  Laterality: N/A;    ENDOSCOPY N/A 2022    Procedure: ESOPHAGOGASTRODUODENOSCOPY WITH BIOPSY;  Surgeon: Kiran Staples Jr., MD;  Location: [facility] ENDOSCOPY;  Service: General;  Laterality: N/A;  PRE- DYSPEPSIA  POST- GASTRITIS    GASTRIC SLEEVE LAPAROSCOPIC N/A 2022    Procedure: GASTRIC SLEEVE LAPAROSCOPIC  with robot assist  ;  Surgeon: Kiran Staples Jr., MD;  Location: [facility] [place] [facility] [place];  Service: Robotics - DaVinci;  Laterality: N/A;       Family History   Problem Relation Age of Onset    Depression Mother     Obesity Mother     Hypertension Mother     Sleep apnea Mother     Anxiety disorder Mother     Hyperlipidemia Mother     Diabetes Father     Heart disease Father     Depression Father     Obesity Father     Hypertension Father     Sleep apnea Father     Hyperlipidemia Father     No Known Problems Sister     Rheum arthritis Brother     Arthritis Brother         Rheumatoid arthritis    No Known Problems Brother     Stroke Maternal Grandmother         also had head trauma    Obesity Maternal Grandmother     Diabetes Maternal Grandmother     Obesity Maternal Grandfather     Heart attack Paternal Grandmother     Diabetes Paternal Grandmother     Depression Paternal Grandmother     Melanoma Paternal Grandfather     Diabetes Paternal Grandfather     Cancer Paternal Grandfather     Hyperlipidemia Paternal Grandfather     Malig Hyperthermia Neg Hx         Current Medications        Current Outpatient Medications:     Etonogestrel (NEXPLANON) 68 MG implant subdermal implant, To be inserted one time by prescriber. Route Subdermal.  Left arm, Disp: , Rfl:     ferrous sulfate 325 (65 FE) MG tablet, Take 1 tablet by mouth Daily With Breakfast., Disp: 30 tablet, Rfl: 2    acetaminophen (TYLENOL) 500 MG tablet, Take 1-2 tablets by mouth Every 6 (Six)  "Hours As Needed for Mild Pain., Disp: , Rfl:     sertraline (Zoloft) 50 MG tablet, Take 0.5 tablets by mouth Daily for 7 days, THEN 1 tablet Daily for 21 days., Disp: 25 tablet, Rfl: 0    sertraline (Zoloft) 50 MG tablet, Take 1 tablet by mouth Daily., Disp: 30 tablet, Rfl: 0     Allergies     Allergies   Allergen Reactions    Pollen Extract Other (See Comments)     HEAD CONGESTION       Social History       Social History     Social History Narrative    Not on file       Immunizations     Immunization:  Immunization History   Administered Date(s) Administered    HPV Quadrivalent 03/08/2013    Tdap 04/16/2021          Objective     Objective     Vital Signs:   BP 93/66 (BP Location: Right arm, Patient Position: Sitting, Cuff Size: Large Adult)   Pulse 74   Temp 98.6 °F (37 °C) (Oral)   Ht 172.7 cm (67.99\")   Wt 107 kg (235 lb 3.2 oz)   SpO2 96%   BMI 35.77 kg/m²       Physical Exam  Vitals and nursing note reviewed.   Constitutional:       Appearance: Normal appearance. She is obese.   HENT:      Head: Normocephalic.   Eyes:      Conjunctiva/sclera: Conjunctivae normal.      Pupils: Pupils are equal, round, and reactive to light.   Cardiovascular:      Rate and Rhythm: Normal rate and regular rhythm.      Pulses: Normal pulses.      Heart sounds: Normal heart sounds.   Pulmonary:      Effort: Pulmonary effort is normal.      Breath sounds: Normal breath sounds.   Abdominal:      General: Bowel sounds are normal.      Palpations: Abdomen is soft.   Musculoskeletal:         General: Normal range of motion.      Cervical back: Normal range of motion and neck supple.   Skin:     General: Skin is warm and dry.   Neurological:      General: No focal deficit present.      Mental Status: She is alert and oriented to person, place, and time.   Psychiatric:         Attention and Perception: Attention normal.         Mood and Affect: Mood and affect normal.         Behavior: Behavior normal. Behavior is cooperative. "         Physical Exam        Results    The following data was reviewed by: CHEN Kinney on 07/14/25               Results           Assessment and Plan        Assessment and Plan       Anxiety and depression      Orders:    sertraline (Zoloft) 50 MG tablet; Take 0.5 tablets by mouth Daily for 7 days, THEN 1 tablet Daily for 21 days.    sertraline (Zoloft) 50 MG tablet; Take 1 tablet by mouth Daily.    Iron deficiency anemia, unspecified iron deficiency anemia type  Continue taking iron supplements and follow-up in 6 weeks as directed by Laura.       Dietary counseling    Orders:    Ambulatory Referral to Nutrition Services    Class 2 obesity without serious comorbidity with body mass index (BMI) of 35.0 to 35.9 in adult, unspecified obesity type  Patient's (Body mass index is 35.77 kg/m².) indicates that they are obese (BMI >30) with health conditions that include none . Weight is unchanged. BMI  is above average; BMI management plan is completed. We discussed portion control, increasing exercise, consulting a Bariatric surgeon, and management of depression/anxiety/stress to control compensatory eating.     Orders:    Ambulatory Referral to Nutrition Services    Ambulatory Referral to Bariatric Surgery         Assessment & Plan  1. Iron deficiency.  - Continued iron supplementation advised.  - Repeat blood work in 6 weeks to assess iron levels.  - Monitoring iron levels through blood work.  - No changes to current iron regimen.    2. Depression and anxiety.  - Prescription for Zoloft provided; start with half a tablet daily for 7 days, then increase to one tablet daily.  - Counseling recommended; advised to allocate 15 minutes daily for relaxation activities.  - Discussed potential benefits of Zoloft and counseling.  - Prescription sent to pharmacy; follow-up in 6 weeks to evaluate response.    3. Weight management.  - Counseled on diet, exercise, portion control, and avoiding processed foods.  - Referral  to dietitian initiated for personalized weight loss plan.  - Discussed importance of protein intake, hydration, and regular physical activity.  - Referral to Dr. Domingo, bariatric surgeon, initiated.    Follow-up  - Follow-up appointment scheduled for 6 weeks to assess response to Zoloft and overall progress.        Follow Up        Follow Up   Return in about 6 weeks (around 8/25/2025) for With PCP, Recheck, sooner if condition worsens.  Patient was given instructions and counseling regarding her condition or for health maintenance advice. Please see specific information pulled into the AVS if appropriate.

## 2025-07-21 ENCOUNTER — OFFICE VISIT (OUTPATIENT)
Age: 30
End: 2025-07-21
Payer: MEDICAID

## 2025-07-21 VITALS
HEART RATE: 73 BPM | BODY MASS INDEX: 35.77 KG/M2 | DIASTOLIC BLOOD PRESSURE: 64 MMHG | HEIGHT: 68 IN | WEIGHT: 236 LBS | OXYGEN SATURATION: 98 % | SYSTOLIC BLOOD PRESSURE: 94 MMHG

## 2025-07-21 DIAGNOSIS — E66.813 OBESITY, CLASS III, BMI 40-49.9 (MORBID OBESITY): Primary | ICD-10-CM

## 2025-07-21 DIAGNOSIS — Z98.84 S/P LAPAROSCOPIC SLEEVE GASTRECTOMY: ICD-10-CM

## 2025-07-21 PROCEDURE — 1160F RVW MEDS BY RX/DR IN RCRD: CPT | Performed by: SURGERY

## 2025-07-21 PROCEDURE — 1159F MED LIST DOCD IN RCRD: CPT | Performed by: SURGERY

## 2025-07-21 PROCEDURE — 99203 OFFICE O/P NEW LOW 30 MIN: CPT | Performed by: SURGERY

## 2025-07-21 NOTE — PROGRESS NOTES
"Chief Complaint  Establish Care, Consult, and Weight Loss    Subjective        Randi Martell presents to [facility] BARIATRIC SURGERY  History of Present Illness  Patient presents in follow-up from previous sleeve gastrectomy 3 years ago--patient has lost nearly 60 pounds, she has recently had a little bit of weight recidivism--she currently has a weight of 236 pounds with a BMI of nearly 36--patient is interested in trying to \"get back on track\"--patient has not been current with her follow-up.  We discussed different options today including recommitting to a protein first diet, increasing her water/fluid intake, increasing her exercise to include daily walking, and taking a daily bariatric multivitamin.  We also talked about the potential of using GLP-1 medication to help augment her weight loss.  Patient currently has Medicaid so she would have to more than likely get compounded semaglutide that would be more affordable.  Patient denies any history of thyroid cancer or pancreatitis.  Finally we talked about the potential revisional bariatric surgery.  Patient does have some reflux.  She states both of her parents recently underwent gastric bypass, her mother was converted from a sleeve gastrectomy due to severe reflux.  Objective   Vital Signs:  BP 94/64 (BP Location: Right arm, Patient Position: Sitting, Cuff Size: Large Adult)   Pulse 73   Ht 172.7 cm (67.99\")   Wt 107 kg (236 lb)   SpO2 98%   BMI 35.89 kg/m²   Estimated body mass index is 35.89 kg/m² as calculated from the following:    Height as of this encounter: 172.7 cm (67.99\").    Weight as of this encounter: 107 kg (236 lb).               Physical Exam   Alert, pleasant  No respiratory distress  Abdomen soft  Result Review :                   Assessment and Plan   Diagnoses and all orders for this visit:    1. Obesity, Class III, BMI 40-49.9 (morbid obesity) (Primary)    2. S/P laparoscopic sleeve gastrectomy    Patient is " going to talk to her  about different options that she has--if she wants to try medication she will plan on MyChart messaging me.  If she opts for revisional surgery I will have her come back in the office to discuss further.       I spent 30 minutes caring for Randi on this date of service. This time includes time spent by me in the following activities:preparing for the visit, reviewing tests, obtaining and/or reviewing a separately obtained history, performing a medically appropriate examination and/or evaluation , counseling and educating the patient/family/caregiver, documenting information in the medical record, and independently interpreting results and communicating that information with the patient/family/caregiver  Follow Up   No follow-ups on file.  Patient was given instructions and counseling regarding her condition or for health maintenance advice. Please see specific information pulled into the AVS if appropriate.

## 2025-08-04 ENCOUNTER — OFFICE VISIT (OUTPATIENT)
Age: 30
End: 2025-08-04
Payer: MEDICAID

## 2025-08-04 VITALS
OXYGEN SATURATION: 100 % | HEIGHT: 68 IN | DIASTOLIC BLOOD PRESSURE: 68 MMHG | BODY MASS INDEX: 35.61 KG/M2 | SYSTOLIC BLOOD PRESSURE: 100 MMHG | HEART RATE: 63 BPM | WEIGHT: 235 LBS

## 2025-08-04 DIAGNOSIS — Z98.84 S/P LAPAROSCOPIC SLEEVE GASTRECTOMY: Primary | ICD-10-CM

## 2025-08-04 DIAGNOSIS — R12 HEARTBURN: ICD-10-CM

## 2025-08-04 DIAGNOSIS — Z71.3 DIETARY COUNSELING: ICD-10-CM

## 2025-08-04 PROCEDURE — 1159F MED LIST DOCD IN RCRD: CPT | Performed by: SURGERY

## 2025-08-04 PROCEDURE — 1160F RVW MEDS BY RX/DR IN RCRD: CPT | Performed by: SURGERY

## 2025-08-04 PROCEDURE — 99214 OFFICE O/P EST MOD 30 MIN: CPT | Performed by: SURGERY

## 2025-08-18 DIAGNOSIS — F41.9 ANXIETY AND DEPRESSION: ICD-10-CM

## 2025-08-18 DIAGNOSIS — F32.A ANXIETY AND DEPRESSION: ICD-10-CM

## 2025-08-19 ENCOUNTER — CONSULT (OUTPATIENT)
Dept: BARIATRICS/WEIGHT MGMT | Facility: CLINIC | Age: 30
End: 2025-08-19
Payer: MEDICAID

## 2025-08-19 ENCOUNTER — LAB (OUTPATIENT)
Dept: LAB | Facility: HOSPITAL | Age: 30
End: 2025-08-19
Payer: MEDICAID

## 2025-08-19 ENCOUNTER — HOSPITAL ENCOUNTER (OUTPATIENT)
Dept: CARDIOLOGY | Facility: HOSPITAL | Age: 30
Discharge: HOME | End: 2025-08-19
Payer: MEDICAID

## 2025-08-19 ENCOUNTER — HOSPITAL ENCOUNTER (OUTPATIENT)
Dept: GENERAL RADIOLOGY | Facility: HOSPITAL | Age: 30
Discharge: HOME | End: 2025-08-19
Payer: MEDICAID

## 2025-08-19 ENCOUNTER — TELEPHONE (OUTPATIENT)
Dept: BARIATRICS/WEIGHT MGMT | Facility: CLINIC | Age: 30
End: 2025-08-19
Payer: MEDICAID

## 2025-08-19 VITALS
WEIGHT: 237 LBS | HEIGHT: 67 IN | HEART RATE: 80 BPM | SYSTOLIC BLOOD PRESSURE: 115 MMHG | TEMPERATURE: 98.2 F | DIASTOLIC BLOOD PRESSURE: 74 MMHG | BODY MASS INDEX: 37.2 KG/M2

## 2025-08-19 DIAGNOSIS — K21.9 GASTROESOPHAGEAL REFLUX DISEASE, UNSPECIFIED WHETHER ESOPHAGITIS PRESENT: ICD-10-CM

## 2025-08-19 DIAGNOSIS — E66.812 OBESITY, CLASS II, BMI 35-39.9: ICD-10-CM

## 2025-08-19 DIAGNOSIS — K91.89 IRON DEFICIENCY ANEMIA AFTER GASTRECTOMY: ICD-10-CM

## 2025-08-19 DIAGNOSIS — D50.8 IRON DEFICIENCY ANEMIA AFTER GASTRECTOMY: ICD-10-CM

## 2025-08-19 DIAGNOSIS — Z01.818 PRE-OP EVALUATION: ICD-10-CM

## 2025-08-19 DIAGNOSIS — E66.812 OBESITY, CLASS II, BMI 35-39.9: Primary | ICD-10-CM

## 2025-08-19 PROBLEM — E66.813 OBESITY, CLASS III, BMI 40-49.9 (MORBID OBESITY): Status: RESOLVED | Noted: 2022-02-15 | Resolved: 2025-08-19

## 2025-08-19 LAB
25(OH)D3 SERPL-MCNC: 41.4 NG/ML (ref 30–100)
FERRITIN SERPL-MCNC: 3.96 NG/ML (ref 13–150)
FOLATE SERPL-MCNC: 15.2 NG/ML (ref 4.78–24.2)
IRON 24H UR-MRATE: 6 MCG/DL (ref 37–145)
PREALB SERPL-MCNC: 21.3 MG/DL (ref 20–40)
QT INTERVAL: 404 MS
QTC INTERVAL: 407 MS

## 2025-08-19 PROCEDURE — 93005 ELECTROCARDIOGRAM TRACING: CPT | Performed by: NURSE PRACTITIONER

## 2025-08-19 PROCEDURE — 83921 ORGANIC ACID SINGLE QUANT: CPT

## 2025-08-19 PROCEDURE — 82746 ASSAY OF FOLIC ACID SERUM: CPT

## 2025-08-19 PROCEDURE — 82728 ASSAY OF FERRITIN: CPT

## 2025-08-19 PROCEDURE — 82306 VITAMIN D 25 HYDROXY: CPT

## 2025-08-19 PROCEDURE — 84425 ASSAY OF VITAMIN B-1: CPT

## 2025-08-19 PROCEDURE — 84134 ASSAY OF PREALBUMIN: CPT

## 2025-08-19 PROCEDURE — 36415 COLL VENOUS BLD VENIPUNCTURE: CPT

## 2025-08-19 PROCEDURE — 71046 X-RAY EXAM CHEST 2 VIEWS: CPT

## 2025-08-19 PROCEDURE — 83540 ASSAY OF IRON: CPT

## 2025-08-19 RX ORDER — CALCIUM CARBONATE 500 MG/1
1 TABLET, CHEWABLE ORAL AS NEEDED
COMMUNITY

## 2025-08-19 RX ORDER — SODIUM CHLORIDE 9 MG/ML
40 INJECTION, SOLUTION INTRAVENOUS AS NEEDED
OUTPATIENT
Start: 2025-08-19

## 2025-08-19 RX ORDER — SODIUM CHLORIDE 0.9 % (FLUSH) 0.9 %
10 SYRINGE (ML) INJECTION AS NEEDED
OUTPATIENT
Start: 2025-08-19

## 2025-08-19 RX ORDER — SODIUM CHLORIDE 0.9 % (FLUSH) 0.9 %
10 SYRINGE (ML) INJECTION EVERY 12 HOURS SCHEDULED
OUTPATIENT
Start: 2025-08-19

## 2025-08-23 LAB — VIT B1 BLD-SCNC: 104.4 NMOL/L (ref 66.5–200)

## 2025-08-25 LAB — METHYLMALONATE SERPL-SCNC: 192 NMOL/L (ref 0–378)

## 2025-08-27 ENCOUNTER — TELEPHONE (OUTPATIENT)
Dept: FAMILY MEDICINE CLINIC | Age: 30
End: 2025-08-27
Payer: MEDICAID

## 2025-08-29 ENCOUNTER — OFFICE VISIT (OUTPATIENT)
Dept: FAMILY MEDICINE CLINIC | Age: 30
End: 2025-08-29
Payer: MEDICAID

## 2025-08-29 VITALS
SYSTOLIC BLOOD PRESSURE: 114 MMHG | HEART RATE: 65 BPM | WEIGHT: 239.6 LBS | BODY MASS INDEX: 37.61 KG/M2 | TEMPERATURE: 97.7 F | HEIGHT: 67 IN | DIASTOLIC BLOOD PRESSURE: 76 MMHG | OXYGEN SATURATION: 99 %

## 2025-08-29 DIAGNOSIS — F32.A ANXIETY AND DEPRESSION: Primary | ICD-10-CM

## 2025-08-29 DIAGNOSIS — Z30.019 ENCOUNTER FOR FEMALE BIRTH CONTROL: ICD-10-CM

## 2025-08-29 DIAGNOSIS — F41.9 ANXIETY AND DEPRESSION: Primary | ICD-10-CM

## 2025-08-29 PROCEDURE — 1159F MED LIST DOCD IN RCRD: CPT | Performed by: NURSE PRACTITIONER

## 2025-08-29 PROCEDURE — 1160F RVW MEDS BY RX/DR IN RCRD: CPT | Performed by: NURSE PRACTITIONER

## 2025-08-29 PROCEDURE — 99214 OFFICE O/P EST MOD 30 MIN: CPT | Performed by: NURSE PRACTITIONER

## (undated) DEVICE — SUT GUT CHRM 3/0 CT1 3/0 36IN 922H

## (undated) DEVICE — VESSEL SEALER EXTEND: Brand: ENDOWRIST

## (undated) DEVICE — GLV SURG SENSICARE MICRO PF LF 6 STRL

## (undated) DEVICE — SPNG LAP 18X18IN LF STRL PK/5

## (undated) DEVICE — LAPAROSCOPIC DISSECTOR: Brand: DEROYAL

## (undated) DEVICE — APL DUPLOSPRAYER MIS 40CM

## (undated) DEVICE — BITEBLOCK OMNI BLOC

## (undated) DEVICE — GOWN,NON-REINFORCED,SIRUS,SET IN SLV,XL: Brand: MEDLINE

## (undated) DEVICE — SUT VIC 0 CT1 27IN DYED J340H

## (undated) DEVICE — SLV SCD KN ADJ EXPRSS LG

## (undated) DEVICE — SUT MNCRYL PLS ANTIB UD 4/0 PS2 18IN

## (undated) DEVICE — SOL LR 1000ML

## (undated) DEVICE — SUT SILK 0 SH 30IN K834H

## (undated) DEVICE — GLV SURG SENSICARE PI MIC PF SZ6 LF STRL

## (undated) DEVICE — GLV SURG SENSICARE POLYISPRN W/ALOE PF LF 6.5 GRN STRL

## (undated) DEVICE — SYR LUERLOK 20CC BX/50

## (undated) DEVICE — GLV SURG SENSICARE MICRO PF LF 6.5 STRL

## (undated) DEVICE — Device: Brand: STANDARD BOUGIE, 38FR

## (undated) DEVICE — SEAL

## (undated) DEVICE — COLUMN DRAPE

## (undated) DEVICE — GLV SURG SENSICARE PI MIC PF SZ8.5 LF STRL

## (undated) DEVICE — KT ORCA ORCAPOD DISP STRL

## (undated) DEVICE — NDL HYPO PRECISIONGLIDE REG 20G 1 1/2

## (undated) DEVICE — CONN TBG Y 5 IN 1 LF STRL

## (undated) DEVICE — APPL CHLORAPREP HI/LITE 26ML ORNG

## (undated) DEVICE — TUBING, SUCTION, 1/4" X 10', STRAIGHT: Brand: MEDLINE

## (undated) DEVICE — 500ML,PRESSURE INFUSER W/STOPCOCK: Brand: MEDLINE

## (undated) DEVICE — LOU GENERAL ROBOT: Brand: MEDLINE INDUSTRIES, INC.

## (undated) DEVICE — LAPAROSCOPIC SMOKE FILTRATION SYSTEM: Brand: PALL LAPAROSHIELD® PLUS LAPAROSCOPIC SMOKE FILTRATION SYSTEM

## (undated) DEVICE — ANTIBACTERIAL UNDYED BRAIDED (POLYGLACTIN 910), SYNTHETIC ABSORBABLE SUTURE: Brand: COATED VICRYL

## (undated) DEVICE — GLV SURG SENSICARE PI PF LF 8.5 GRN STRL

## (undated) DEVICE — TRAXI EXTENDER (BMI>50 - USED WITH PH-25): Brand: TRAXI® PANNICULUS RETRACTOR EXTENDER

## (undated) DEVICE — DEV COND GAS LAP INSUFLOW W/LUER CONN

## (undated) DEVICE — MINI ENDOCUT SCISSOR TIP, DISPOSABLE: Brand: RENEW

## (undated) DEVICE — IRRIGATOR BULB ASEPTO 60CC STRL

## (undated) DEVICE — SUT VIC 0 CT 36IN J958H

## (undated) DEVICE — 3M(TM) TEGADERM(TM) TRANSPARENT FILM DRESSING FRAME STYLE 1627: Brand: 3M™ TEGADERM™

## (undated) DEVICE — REDUCER: Brand: ENDOWRIST

## (undated) DEVICE — SENSR O2 OXIMAX FNGR A/ 18IN NONSTR

## (undated) DEVICE — ARM DRAPE

## (undated) DEVICE — TROC STANDARDTROCAR FOR/TITANSGS 19MM DISP STRL

## (undated) DEVICE — SOL IRR H2O BTL 1000ML STRL

## (undated) DEVICE — BLADELESS OBTURATOR: Brand: WECK VISTA

## (undated) DEVICE — CANNULA SEAL

## (undated) DEVICE — FRCP BX RADJAW4 NDL 2.8 240CM LG OG BX40

## (undated) DEVICE — VISUALIZATION SYSTEM: Brand: CLEARIFY

## (undated) DEVICE — 3M™ STERI-STRIP™ REINFORCED ADHESIVE SKIN CLOSURES, R1547, 1/2 IN X 4 IN (12 MM X 100 MM), 6 STRIPS/ENVELOPE: Brand: 3M™ STERI-STRIP™

## (undated) DEVICE — ADAPT CLN BIOGUARD AIR/H2O DISP

## (undated) DEVICE — MSK PROC CURAPLEX O2 2/ADAPT 7FT

## (undated) DEVICE — MARKR SKIN W/RULR AND LBL

## (undated) DEVICE — LN SMPL CO2 SHTRM SD STREAM W/M LUER

## (undated) DEVICE — DRAPE,UTILITY,TAPE,15X26,STERILE: Brand: MEDLINE

## (undated) DEVICE — 3M™ STERI-STRIP™ COMPOUND BENZOIN TINCTURE 40 BAGS/CARTON 4 CARTONS/CASE C1544: Brand: 3M™ STERI-STRIP™

## (undated) DEVICE — ENDOPATH XCEL WITH OPTIVIEW TECHNOLOGY BLADELESS TROCARS WITH STABILITY SLEEVES: Brand: ENDOPATH XCEL OPTIVIEW